# Patient Record
Sex: FEMALE | Race: WHITE
[De-identification: names, ages, dates, MRNs, and addresses within clinical notes are randomized per-mention and may not be internally consistent; named-entity substitution may affect disease eponyms.]

---

## 2021-12-20 ENCOUNTER — HOSPITAL ENCOUNTER (EMERGENCY)
Dept: HOSPITAL 61 - ER | Age: 46
Discharge: HOME | End: 2021-12-20
Payer: COMMERCIAL

## 2021-12-20 VITALS — DIASTOLIC BLOOD PRESSURE: 74 MMHG | SYSTOLIC BLOOD PRESSURE: 116 MMHG

## 2021-12-20 VITALS — WEIGHT: 190.48 LBS | HEIGHT: 62 IN | BODY MASS INDEX: 35.05 KG/M2

## 2021-12-20 DIAGNOSIS — K80.20: Primary | ICD-10-CM

## 2021-12-20 LAB
ALBUMIN SERPL-MCNC: 3.2 G/DL (ref 3.4–5)
ALBUMIN/GLOB SERPL: 0.9 {RATIO} (ref 1–1.7)
ALP SERPL-CCNC: 72 U/L (ref 46–116)
ALT SERPL-CCNC: 43 U/L (ref 14–59)
ANION GAP SERPL CALC-SCNC: 10 MMOL/L (ref 6–14)
APTT PPP: YELLOW S
AST SERPL-CCNC: 20 U/L (ref 15–37)
BACTERIA #/AREA URNS HPF: (no result) /HPF
BASOPHILS # BLD AUTO: 0.2 X10^3/UL (ref 0–0.2)
BASOPHILS NFR BLD: 1 % (ref 0–3)
BILIRUB SERPL-MCNC: 0.2 MG/DL (ref 0.2–1)
BILIRUB UR QL STRIP: NEGATIVE
BUN SERPL-MCNC: 17 MG/DL (ref 7–20)
BUN/CREAT SERPL: 21 (ref 6–20)
CALCIUM SERPL-MCNC: 9 MG/DL (ref 8.5–10.1)
CHLORIDE SERPL-SCNC: 102 MMOL/L (ref 98–107)
CO2 SERPL-SCNC: 26 MMOL/L (ref 21–32)
CREAT SERPL-MCNC: 0.8 MG/DL (ref 0.6–1)
EOSINOPHIL NFR BLD: 0.3 X10^3/UL (ref 0–0.7)
EOSINOPHIL NFR BLD: 2 % (ref 0–3)
ERYTHROCYTE [DISTWIDTH] IN BLOOD BY AUTOMATED COUNT: 13.2 % (ref 11.5–14.5)
FIBRINOGEN PPP-MCNC: CLEAR MG/DL
GFR SERPLBLD BASED ON 1.73 SQ M-ARVRAT: 77.6 ML/MIN
GLUCOSE SERPL-MCNC: 115 MG/DL (ref 70–99)
HCT VFR BLD CALC: 36.5 % (ref 36–47)
HGB BLD-MCNC: 12.4 G/DL (ref 12–15.5)
LIPASE: 91 U/L (ref 73–393)
LYMPHOCYTES # BLD: 2.8 X10^3/UL (ref 1–4.8)
LYMPHOCYTES NFR BLD AUTO: 23 % (ref 24–48)
MAGNESIUM SERPL-MCNC: 1.7 MG/DL (ref 1.8–2.4)
MCH RBC QN AUTO: 31 PG (ref 25–35)
MCHC RBC AUTO-ENTMCNC: 34 G/DL (ref 31–37)
MCV RBC AUTO: 91 FL (ref 79–100)
MONO #: 0.7 X10^3/UL (ref 0–1.1)
MONOCYTES NFR BLD: 6 % (ref 0–9)
NEUT #: 8.1 X10^3/UL (ref 1.8–7.7)
NEUTROPHILS NFR BLD AUTO: 67 % (ref 31–73)
NITRITE UR QL STRIP: NEGATIVE
PH UR STRIP: 7 [PH]
PLATELET # BLD AUTO: 210 X10^3/UL (ref 140–400)
POTASSIUM SERPL-SCNC: 3.5 MMOL/L (ref 3.5–5.1)
PROT SERPL-MCNC: 6.9 G/DL (ref 6.4–8.2)
PROT UR STRIP-MCNC: NEGATIVE MG/DL
RBC # BLD AUTO: 4 X10^6/UL (ref 3.5–5.4)
RBC #/AREA URNS HPF: (no result) /HPF (ref 0–2)
SODIUM SERPL-SCNC: 138 MMOL/L (ref 136–145)
UROBILINOGEN UR-MCNC: 0.2 MG/DL
WBC # BLD AUTO: 12 X10^3/UL (ref 4–11)
WBC #/AREA URNS HPF: (no result) /HPF (ref 0–4)

## 2021-12-20 PROCEDURE — 85025 COMPLETE CBC W/AUTO DIFF WBC: CPT

## 2021-12-20 PROCEDURE — 87086 URINE CULTURE/COLONY COUNT: CPT

## 2021-12-20 PROCEDURE — 80053 COMPREHEN METABOLIC PANEL: CPT

## 2021-12-20 PROCEDURE — 96375 TX/PRO/DX INJ NEW DRUG ADDON: CPT

## 2021-12-20 PROCEDURE — 99285 EMERGENCY DEPT VISIT HI MDM: CPT

## 2021-12-20 PROCEDURE — 96361 HYDRATE IV INFUSION ADD-ON: CPT

## 2021-12-20 PROCEDURE — 81001 URINALYSIS AUTO W/SCOPE: CPT

## 2021-12-20 PROCEDURE — 74176 CT ABD & PELVIS W/O CONTRAST: CPT

## 2021-12-20 PROCEDURE — 36415 COLL VENOUS BLD VENIPUNCTURE: CPT

## 2021-12-20 PROCEDURE — 96374 THER/PROPH/DIAG INJ IV PUSH: CPT

## 2021-12-20 PROCEDURE — 83735 ASSAY OF MAGNESIUM: CPT

## 2021-12-20 PROCEDURE — 83690 ASSAY OF LIPASE: CPT

## 2021-12-20 NOTE — RAD
EXAM: CT ABDOMEN/PELVIS WITHOUT CONTRAST.



HISTORY: Right flank pain.



TECHNIQUE: Computed tomography of the abdomen and pelvis was performed without intravenous contrast. 
One or more of the following individualized dose reduction techniques were utilized for this examinat
ion:  

1. Automated exposure control.  

2. Adjustment of the mA and/or kV according to patient size.  

3. Use of iterative reconstruction technique.



COMPARISON: None.



FINDINGS: Lung windows through the visualized portions of the bases reveal a mild pectus excavatum de
formity. Bilateral mastectomy changes are suspected. Bone windows reveal no suspicious lesions.



Hypoattenuation of the hepatic parenchyma is consistent with diffuse hepatic steatosis. There are roberta
cified granulomas in the spleen. A gallstone is noted. An 18 x 12 mm nodule in the left adrenal gland
 measures 3 Hounsfield units and is consistent with a benign adenoma. The right adrenal gland is unre
markable. There are no pathologically enlarged lymph nodes.



The appendix is not inflamed. There is no small bowel obstruction.



There are no renal or ureteral calculi. There is no hydronephrosis. The kidneys are unremarkable with
out contrast.



IMPRESSION: 

1. No renal or ureteral calculi.

2. Cholelithiasis.

3. 18 mm benign left adrenal adenoma.

4. Suspect diffuse hepatic steatosis.



Electronically signed by: MELONY Gibbs MD (12/20/2021 3:50 AM) [facility]

## 2021-12-20 NOTE — PHYS DOC
Past Medical History


Additional Past Medical Histor:  BREAST CA, THYROID DISEASE, OVARIAN CYSTS


Past Surgical History:  Cancer Surgery, Other


Additional Past Surgical Histo:  DOUBLE MASTECTOMY, OVARIAN CYST REMOVAL, 75% 

THYROIDECTOMY,PORT


Smoking Status:  Never Smoker


Alcohol Use:  Occasionally





General Adult


EDM:


Chief Complaint:  ABDOMINAL PAIN





HPI:


HPI:


Patient is a 46  year old female presenting with abdominal pain for 3 hours. 

Patient's pain starts in the right upper quadrant and radiates to the right 

flank. She has a similar episode last Friday but states today's episode is 

worse. She reports feeling hot and clammy but denies nausea, vomiting and 

diarrhea.





Review of Systems:


Review of Systems:


Constitutional: Denies fever or chills 


Eyes: Denies redness or eye pain 


HENT: Denies nasal congestion or sore throat


Respiratory: Denies cough or shortness of breath 


Cardiovascular: Denies chest pain or palpitations


GI: Reports abdominal pain. Denies nausea, or vomiting


: Denies dysuria or hematuria. Reports right flank pain


Musculoskeletal: Denies back pain or joint pain


Integument: Denies rash or skin lesions 


Neurologic: Denies headache, focal weakness or sensory changes





Complete systems were reviewed and found to be within normal limits, except as 

documented in this note.





Heart Score:


C/O Chest Pain:  N/A





Current Medications:





Current Medications








 Medications


  (Trade)  Dose


 Ordered  Sig/[facility]  Start Time


 Stop Time Status Last Admin


Dose Admin


 


 Ketorolac


 Tromethamine


  (Toradol 30mg


 Vial)  15 mg  1X  ONCE  12/20/21 03:00


 12/20/21 03:01 DC 12/20/21 02:40


15 MG


 


 Metoclopramide HCl


  (Reglan Vial)  10 mg  1X  ONCE  12/20/21 03:00


 12/20/21 03:01 DC 12/20/21 02:41


10 MG


 


 Sodium Chloride  1,000 ml @ 


 1,000 mls/hr  1X  ONCE  12/20/21 03:00


 12/20/21 03:59  12/20/21 02:42


1,000 MLS/HR











Allergies:


Allergies:





Allergies








Coded Allergies Type Severity Reaction Last Updated Verified


 


  No Known Drug Allergies    12/20/21 No











Physical Exam:


PE:


Constitutional: Well developed, well nourished, mild distress, non-toxic 

appearance


HENT: Normocephalic, atraumatic


Eyes: PERRL, EOMI, conjunctiva normal, no discharge


Neck: Normal range of motion, no tenderness, supple


Lungs & Thorax:  No respiratory distress, equal chest rise and fall


Abdomen: Tenderness and guarding on the right upper quadrant. Normal bowel 

sounds in all quadrants. 


Skin: Warm, dry, no erythema, no rash


Back: No tenderness, Tenderness to palpation at the right CVA


Extremities: No tenderness, ROM intact, no edema


Neurologic: Alert and oriented X 3, normal motor function, normal sensory 

function, no focal deficits noted


Psychologic: Affect normal, judgment normal





Current Patient Data:


Labs:





                                Laboratory Tests








Test


 12/20/21


01:42 12/20/21


02:38


 


Urine Collection Type Unknown   


 


Urine Color Yellow   


 


Urine Clarity Clear   


 


Urine pH


 7.0 (<5.0-8.0)


 





 


Urine Specific Gravity


 1.015


(1.000-1.030) 





 


Urine Protein


 Negative mg/dL


(NEG-TRACE) 





 


Urine Glucose (UA)


 Negative mg/dL


(NEG) 





 


Urine Ketones (Stick)


 Negative mg/dL


(NEG) 





 


Urine Blood


 Negative (NEG)


 





 


Urine Nitrite


 Negative (NEG)


 





 


Urine Bilirubin


 Negative (NEG)


 





 


Urine Urobilinogen Dipstick


 0.2 mg/dL (0.2


mg/dL) 





 


Urine Leukocyte Esterase


 Moderate (NEG)


 





 


Urine RBC


 Occ /HPF (0-2)


 





 


Urine WBC


 20-40 /HPF


(0-4) 





 


Urine Squamous Epithelial


Cells Many /LPF  


 





 


Urine Bacteria


 Many /HPF


(0-FEW) 





 


Urine Mucus Mod /LPF   


 


White Blood Count


 


 12.0 x10^3/uL


(4.0-11.0)  H


 


Red Blood Count


 


 4.00 x10^6/uL


(3.50-5.40)


 


Hemoglobin


 


 12.4 g/dL


(12.0-15.5)


 


Hematocrit


 


 36.5 %


(36.0-47.0)


 


Mean Corpuscular Volume


 


 91 fL ()





 


Mean Corpuscular Hemoglobin  31 pg (25-35)  


 


Mean Corpuscular Hemoglobin


Concent 


 34 g/dL


(31-37)


 


Red Cell Distribution Width


 


 13.2 %


(11.5-14.5)


 


Platelet Count


 


 210 x10^3/uL


(140-400)


 


Neutrophils (%) (Auto)  67 % (31-73)  


 


Lymphocytes (%) (Auto)  23 % (24-48)  L


 


Monocytes (%) (Auto)  6 % (0-9)  


 


Eosinophils (%) (Auto)  2 % (0-3)  


 


Basophils (%) (Auto)  1 % (0-3)  


 


Neutrophils # (Auto)


 


 8.1 x10^3/uL


(1.8-7.7)  H


 


Lymphocytes # (Auto)


 


 2.8 x10^3/uL


(1.0-4.8)


 


Monocytes # (Auto)


 


 0.7 x10^3/uL


(0.0-1.1)


 


Eosinophils # (Auto)


 


 0.3 x10^3/uL


(0.0-0.7)


 


Basophils # (Auto)


 


 0.2 x10^3/uL


(0.0-0.2)


 


Sodium Level


 


 138 mmol/L


(136-145)


 


Potassium Level


 


 3.5 mmol/L


(3.5-5.1)


 


Chloride Level


 


 102 mmol/L


()


 


Carbon Dioxide Level


 


 26 mmol/L


(21-32)


 


Anion Gap  10 (6-14)  


 


Blood Urea Nitrogen


 


 17 mg/dL


(7-20)


 


Creatinine


 


 0.8 mg/dL


(0.6-1.0)


 


Estimated GFR


(Cockcroft-Gault) 


 77.6  





 


BUN/Creatinine Ratio  21 (6-20)  H


 


Glucose Level


 


 115 mg/dL


(70-99)  H


 


Calcium Level


 


 9.0 mg/dL


(8.5-10.1)


 


Magnesium Level


 


 1.7 mg/dL


(1.8-2.4)  L


 


Total Bilirubin


 


 0.2 mg/dL


(0.2-1.0)


 


Aspartate Amino Transferase


(AST) 


 20 U/L (15-37)





 


Alanine Aminotransferase (ALT)


 


 43 U/L (14-59)





 


Alkaline Phosphatase


 


 72 U/L


()


 


Total Protein


 


 6.9 g/dL


(6.4-8.2)


 


Albumin


 


 3.2 g/dL


(3.4-5.0)  L


 


Albumin/Globulin Ratio


 


 0.9 (1.0-1.7)


L


 


Lipase


 


 91 U/L


()





                                Laboratory Tests


12/20/21 02:38








                                Laboratory Tests


12/20/21 02:38








Vital Signs:





                                   Vital Signs








  Date Time  Temp Pulse Resp B/P (MAP) Pulse Ox O2 Delivery O2 Flow Rate FiO2


 


12/20/21 02:44  70 17 135/86 (102) 97 Room Air  


 


12/20/21 01:49 98.1       





 98.1       











Course & Med Decision Making:


Course & Med Decision Making


41 year old female presenting with right upper quadrant abdominal pain and right

flank pain for 3 hours. Patient has no nausea, vomiting or diarrhea and feels 

hot and clammy. CT of the abdomen and pelvis showed cholelithiasis and negative 

for kidney stone. Urine showed moderate leukocyte esterase and bacteria, 

possibly suggestive of pyelonephritis. Based on these findings, patient's 

condition may be caused by cholelithiasis and/or pyelonephritis. Patient will be

treated with antibiotics and discharged home. Patient stable for discharge with 

outpatient follow-up with PCP. Discussed findings and plan with patient, who 

acknowledges understanding and agreement.





Dragon Disclaimer:


Dragon Disclaimer:


This electronic medical record was generated, in whole or in part, using a voice

recognition dictation system.





Departure


Departure


Impression:  


   Primary Impression:  


   Cholelithiasis


   Qualified Codes:  K80.20 - Calculus of gallbladder without cholecystitis 

   without obstruction


   Additional Impression:  


   Flank pain


Disposition:  01 HOME / SELF CARE / HOMELESS


Condition:  STABLE


Referrals:  


ALL HOLDEN MD (PCP)








GURDEEP HOUSTON MD


Patient Instructions:  Abdominal Pain, Easy-to-Read, Cholelithiasis, 

Easy-to-Read, Pyelonephritis, Adult, Easy-to-Read


Scripts


Hydrocodone Bit/Acetaminophen (HYDROCODONE-APAP 5-325  **) 1 Tab Tablet


0.5-1 TAB PO PRN Q6HRS PRN for PAIN, #14 TAB 0 Refills


   Prov: LAVELLE BARTLETT DO         12/20/21 


Cephalexin (CEPHALEXIN) 500 Mg Tablet


1 TAB PO TID for 7 Days, #21 TAB


   Prov: LAVELLE BARTLETT DO         12/20/21











LAVELLE BARTLETT DO             Dec 20, 2021 03:51

## 2022-02-07 ENCOUNTER — HOSPITAL ENCOUNTER (OUTPATIENT)
Dept: HOSPITAL 61 - ER | Age: 47
Setting detail: OBSERVATION
LOS: 2 days | Discharge: HOME | End: 2022-02-09
Attending: INTERNAL MEDICINE | Admitting: INTERNAL MEDICINE
Payer: COMMERCIAL

## 2022-02-07 VITALS — DIASTOLIC BLOOD PRESSURE: 59 MMHG | SYSTOLIC BLOOD PRESSURE: 100 MMHG

## 2022-02-07 VITALS — HEIGHT: 62 IN | BODY MASS INDEX: 35.05 KG/M2 | WEIGHT: 190.48 LBS

## 2022-02-07 VITALS — SYSTOLIC BLOOD PRESSURE: 117 MMHG | DIASTOLIC BLOOD PRESSURE: 90 MMHG

## 2022-02-07 VITALS — DIASTOLIC BLOOD PRESSURE: 76 MMHG | SYSTOLIC BLOOD PRESSURE: 110 MMHG

## 2022-02-07 VITALS — SYSTOLIC BLOOD PRESSURE: 90 MMHG | DIASTOLIC BLOOD PRESSURE: 49 MMHG

## 2022-02-07 DIAGNOSIS — Z79.899: ICD-10-CM

## 2022-02-07 DIAGNOSIS — Z92.21: ICD-10-CM

## 2022-02-07 DIAGNOSIS — C50.919: ICD-10-CM

## 2022-02-07 DIAGNOSIS — Z98.890: ICD-10-CM

## 2022-02-07 DIAGNOSIS — E89.0: ICD-10-CM

## 2022-02-07 DIAGNOSIS — K80.00: ICD-10-CM

## 2022-02-07 DIAGNOSIS — K80.10: ICD-10-CM

## 2022-02-07 DIAGNOSIS — Z87.891: ICD-10-CM

## 2022-02-07 DIAGNOSIS — Z79.810: ICD-10-CM

## 2022-02-07 DIAGNOSIS — I10: ICD-10-CM

## 2022-02-07 DIAGNOSIS — R10.11: ICD-10-CM

## 2022-02-07 DIAGNOSIS — D35.00: ICD-10-CM

## 2022-02-07 DIAGNOSIS — K21.9: ICD-10-CM

## 2022-02-07 DIAGNOSIS — K80.40: ICD-10-CM

## 2022-02-07 DIAGNOSIS — Z90.13: ICD-10-CM

## 2022-02-07 DIAGNOSIS — K76.0: ICD-10-CM

## 2022-02-07 DIAGNOSIS — K82.8: ICD-10-CM

## 2022-02-07 DIAGNOSIS — N39.0: ICD-10-CM

## 2022-02-07 DIAGNOSIS — K80.01: Primary | ICD-10-CM

## 2022-02-07 DIAGNOSIS — K83.9: ICD-10-CM

## 2022-02-07 DIAGNOSIS — N83.209: ICD-10-CM

## 2022-02-07 DIAGNOSIS — E04.9: ICD-10-CM

## 2022-02-07 DIAGNOSIS — Z20.822: ICD-10-CM

## 2022-02-07 DIAGNOSIS — Z85.3: ICD-10-CM

## 2022-02-07 DIAGNOSIS — R13.10: ICD-10-CM

## 2022-02-07 LAB
ALBUMIN SERPL-MCNC: 3.7 G/DL (ref 3.4–5)
ALBUMIN/GLOB SERPL: 0.9 {RATIO} (ref 1–1.7)
ALP SERPL-CCNC: 68 U/L (ref 46–116)
ALT SERPL-CCNC: 40 U/L (ref 14–59)
ANION GAP SERPL CALC-SCNC: 13 MMOL/L (ref 6–14)
APTT PPP: YELLOW S
AST SERPL-CCNC: 18 U/L (ref 15–37)
BACTERIA #/AREA URNS HPF: (no result) /HPF
BASOPHILS # BLD AUTO: 0.1 X10^3/UL (ref 0–0.2)
BASOPHILS NFR BLD: 1 % (ref 0–3)
BILIRUB SERPL-MCNC: 0.2 MG/DL (ref 0.2–1)
BILIRUB UR QL STRIP: NEGATIVE
BUN SERPL-MCNC: 14 MG/DL (ref 7–20)
BUN/CREAT SERPL: 18 (ref 6–20)
CALCIUM SERPL-MCNC: 9.4 MG/DL (ref 8.5–10.1)
CHLORIDE SERPL-SCNC: 100 MMOL/L (ref 98–107)
CO2 SERPL-SCNC: 26 MMOL/L (ref 21–32)
CREAT SERPL-MCNC: 0.8 MG/DL (ref 0.6–1)
EOSINOPHIL NFR BLD: 0 % (ref 0–3)
EOSINOPHIL NFR BLD: 0 X10^3/UL (ref 0–0.7)
ERYTHROCYTE [DISTWIDTH] IN BLOOD BY AUTOMATED COUNT: 13.2 % (ref 11.5–14.5)
FIBRINOGEN PPP-MCNC: CLEAR MG/DL
GFR SERPLBLD BASED ON 1.73 SQ M-ARVRAT: 77.6 ML/MIN
GLUCOSE SERPL-MCNC: 123 MG/DL (ref 70–99)
HCT VFR BLD CALC: 39.4 % (ref 36–47)
HGB BLD-MCNC: 13.4 G/DL (ref 12–15.5)
LIPASE: 68 U/L (ref 73–393)
LYMPHOCYTES # BLD: 1.7 X10^3/UL (ref 1–4.8)
LYMPHOCYTES NFR BLD AUTO: 14 % (ref 24–48)
MCH RBC QN AUTO: 31 PG (ref 25–35)
MCHC RBC AUTO-ENTMCNC: 34 G/DL (ref 31–37)
MCV RBC AUTO: 91 FL (ref 79–100)
MONO #: 0.3 X10^3/UL (ref 0–1.1)
MONOCYTES NFR BLD: 3 % (ref 0–9)
NEUT #: 10.3 X10^3/UL (ref 1.8–7.7)
NEUTROPHILS NFR BLD AUTO: 83 % (ref 31–73)
NITRITE UR QL STRIP: NEGATIVE
PH UR STRIP: 6.5 [PH]
PLATELET # BLD AUTO: 249 X10^3/UL (ref 140–400)
POTASSIUM SERPL-SCNC: 3.6 MMOL/L (ref 3.5–5.1)
PROT SERPL-MCNC: 7.8 G/DL (ref 6.4–8.2)
PROT UR STRIP-MCNC: NEGATIVE MG/DL
RBC # BLD AUTO: 4.34 X10^6/UL (ref 3.5–5.4)
RBC #/AREA URNS HPF: (no result) /HPF (ref 0–2)
SODIUM SERPL-SCNC: 139 MMOL/L (ref 136–145)
UROBILINOGEN UR-MCNC: 0.2 MG/DL
WBC # BLD AUTO: 12.4 X10^3/UL (ref 4–11)
WBC #/AREA URNS HPF: (no result) /HPF (ref 0–4)
YEAST #/AREA URNS HPF: PRESENT /HPF

## 2022-02-07 PROCEDURE — 87106 FUNGI IDENTIFICATION YEAST: CPT

## 2022-02-07 PROCEDURE — 96366 THER/PROPH/DIAG IV INF ADDON: CPT

## 2022-02-07 PROCEDURE — A4314 CATH W/DRAINAGE 2-WAY LATEX: HCPCS

## 2022-02-07 PROCEDURE — 83690 ASSAY OF LIPASE: CPT

## 2022-02-07 PROCEDURE — 87086 URINE CULTURE/COLONY COUNT: CPT

## 2022-02-07 PROCEDURE — 76705 ECHO EXAM OF ABDOMEN: CPT

## 2022-02-07 PROCEDURE — 87040 BLOOD CULTURE FOR BACTERIA: CPT

## 2022-02-07 PROCEDURE — 87426 SARSCOV CORONAVIRUS AG IA: CPT

## 2022-02-07 PROCEDURE — 96361 HYDRATE IV INFUSION ADD-ON: CPT

## 2022-02-07 PROCEDURE — U0003 INFECTIOUS AGENT DETECTION BY NUCLEIC ACID (DNA OR RNA); SEVERE ACUTE RESPIRATORY SYNDROME CORONAVIRUS 2 (SARS-COV-2) (CORONAVIRUS DISEASE [COVID-19]), AMPLIFIED PROBE TECHNIQUE, MAKING USE OF HIGH THROUGHPUT TECHNOLOGIES AS DESCRIBED BY CMS-2020-01-R: HCPCS

## 2022-02-07 PROCEDURE — 96365 THER/PROPH/DIAG IV INF INIT: CPT

## 2022-02-07 PROCEDURE — 99285 EMERGENCY DEPT VISIT HI MDM: CPT

## 2022-02-07 PROCEDURE — 74300 X-RAY BILE DUCTS/PANCREAS: CPT

## 2022-02-07 PROCEDURE — 80076 HEPATIC FUNCTION PANEL: CPT

## 2022-02-07 PROCEDURE — G0378 HOSPITAL OBSERVATION PER HR: HCPCS

## 2022-02-07 PROCEDURE — 80048 BASIC METABOLIC PNL TOTAL CA: CPT

## 2022-02-07 PROCEDURE — G0379 DIRECT REFER HOSPITAL OBSERV: HCPCS

## 2022-02-07 PROCEDURE — 47563 LAPARO CHOLECYSTECTOMY/GRAPH: CPT

## 2022-02-07 PROCEDURE — 88304 TISSUE EXAM BY PATHOLOGIST: CPT

## 2022-02-07 PROCEDURE — 96375 TX/PRO/DX INJ NEW DRUG ADDON: CPT

## 2022-02-07 PROCEDURE — A4213 20+ CC SYRINGE ONLY: HCPCS

## 2022-02-07 PROCEDURE — 81025 URINE PREGNANCY TEST: CPT

## 2022-02-07 PROCEDURE — 85025 COMPLETE CBC W/AUTO DIFF WBC: CPT

## 2022-02-07 PROCEDURE — 96376 TX/PRO/DX INJ SAME DRUG ADON: CPT

## 2022-02-07 PROCEDURE — 80053 COMPREHEN METABOLIC PANEL: CPT

## 2022-02-07 PROCEDURE — A6219 GAUZE <= 16 SQ IN W/BORDER: HCPCS

## 2022-02-07 PROCEDURE — 36415 COLL VENOUS BLD VENIPUNCTURE: CPT

## 2022-02-07 PROCEDURE — C1887 CATHETER, GUIDING: HCPCS

## 2022-02-07 PROCEDURE — A4930 STERILE, GLOVES PER PAIR: HCPCS

## 2022-02-07 PROCEDURE — C9113 INJ PANTOPRAZOLE SODIUM, VIA: HCPCS

## 2022-02-07 PROCEDURE — 81001 URINALYSIS AUTO W/SCOPE: CPT

## 2022-02-07 RX ADMIN — PIPERACILLIN SODIUM AND TAZOBACTAM SODIUM SCH MLS/HR: 3; .375 INJECTION, POWDER, LYOPHILIZED, FOR SOLUTION INTRAVENOUS at 23:03

## 2022-02-07 RX ADMIN — PIPERACILLIN SODIUM AND TAZOBACTAM SODIUM SCH MLS/HR: 3; .375 INJECTION, POWDER, LYOPHILIZED, FOR SOLUTION INTRAVENOUS at 13:41

## 2022-02-07 RX ADMIN — PANTOPRAZOLE SODIUM SCH MG: 40 INJECTION, POWDER, FOR SOLUTION INTRAVENOUS at 11:43

## 2022-02-07 RX ADMIN — PIPERACILLIN SODIUM AND TAZOBACTAM SODIUM SCH MLS/HR: 3; .375 INJECTION, POWDER, LYOPHILIZED, FOR SOLUTION INTRAVENOUS at 17:57

## 2022-02-07 NOTE — PDOC2
CLAUDIA BERG APRN 2/7/22 1315:


CONSULT


Date of Consult


Date of Consult


DATE: 2/7/22 


TIME: 13:03





Reason for Consult


Reason for Consult:


cholecystitis





Referring Physician


Referring Physician:


ER





Identification/Chief Complaint


Chief Complaint


abdominal pain





Source


Source:  Chart review, Patient





History of Present Illness


Reason for Visit:


Acute onset of upper abdominal pain, radiates to back, associated nausea, em

esis.  Similar symptoms in Dec, also treated for UTI, resolved, has not 

reoccurred until now





Past Medical History


GI:  GERD


Heme/Onc:  Cancer (breast )


Psych:  Anxiety





Past Surgical History


Past Surgical History:  No pertinent history





Family History


Family History:  Other (non contributory to current illness )





Social History


Quit


ALCOHOL:  none


Drugs:  None





Current Problem List


Problem List


Problems


Medical Problems:


(1) Acute cholecystitis


Status: Acute  





(2) Right upper quadrant abdominal pain


Status: Acute  











Current Medications


Current Medications





Current Medications


Sodium Chloride 1,000 ml @  1,000 mls/hr 1X  ONCE IV  Last administered on 

2/7/22at 06:29;  Start 2/7/22 at 06:15;  Stop 2/7/22 at 07:14;  Status DC


Ondansetron HCl (Zofran) 4 mg 1X  ONCE IVP  Last administered on 2/7/22at 06:29;

 Start 2/7/22 at 06:15;  Stop 2/7/22 at 06:17;  Status DC


Morphine Sulfate (Morphine Sulfate) 4 mg 1X  ONCE IVP  Last administered on 

2/7/22at 06:28;  Start 2/7/22 at 06:30;  Stop 2/7/22 at 06:31;  Status DC


Ketorolac Tromethamine (Toradol 15mg Vial) 15 mg 1X  ONCE IVP  Last administered

on 2/7/22at 06:58;  Start 2/7/22 at 06:30;  Stop 2/7/22 at 06:31;  Status DC


Piperacillin Sod/ Tazobactam Sod 4.5 gm/Sodium Chloride 100 ml @  200 mls/hr 1X 

ONCE IV ;  Start 2/7/22 at 07:30;  Stop 2/7/22 at 07:32;  Status DC


Piperacillin Sod/ Tazobactam Sod 4.5 gm/Dextrose 100 ml @  200 mls/hr 1X  ONCE 

IV  Last administered on 2/7/22at 08:25;  Start 2/7/22 at 07:32;  Stop 2/7/22 at

07:59;  Status DC


Ondansetron HCl (Zofran) 4 mg PRN Q8HRS  PRN IVP NAUSEA/VOMITING;  Start 2/7/22 

at 07:45;  Stop 2/8/22 at 07:44


Morphine Sulfate (Morphine Sulfate) 2 mg PRN Q2HR  PRN IVP PAIN;  Start 2/7/22 

at 07:45;  Stop 2/8/22 at 07:44


Dextrose/Lactated Ringer's 1,000 ml @  125 mls/hr 1X  ONCE IV  Last administered

on 2/7/22at 11:41;  Start 2/7/22 at 07:45;  Stop 2/7/22 at 15:44


Pantoprazole Sodium (PROTONIX VIAL for IV PUSH) 40 mg DAILY IVP  Last 

administered on 2/7/22at 11:43;  Start 2/7/22 at 09:15


Piperacillin Sod/ Tazobactam Sod 3.375 gm/Sodium Chloride 50 ml @  100 mls/hr 

Q6HRS IV ;  Start 2/7/22 at 13:30





Active Scripts


Active


Hydrocodone-Apap 5-325  ** (Hydrocodone Bit/Acetaminophen) 1 Tab Tablet 0.5-1 

Tab PO PRN Q6HRS PRN


Cephalexin 500 Mg Tablet 1 Tab PO TID 7 Days





Allergies


Allergies:  


Coded Allergies:  


     No Known Drug Allergies (Unverified , 12/20/21)





ROS


General:  No: Chills, Malaise


PSYCHOLOGICAL ROS:  No: Anxiety, Depression


Eyes:  No Blurry vision


HEENT:  No: Heacaches, Sore Throat


Hematological and Lymphatic:  No: Bleeding Problems, Blood Clots


Respiratory:  No: Cough, Shortness of breath


Cardiovascular:  No Chest Pain, No Palpitations


Gastrointestinal:  Yes Other (see hpi)


Genitourinary:  No Dysuria, No Hematuria


Musculoskeletal:  No Joint Pain, No Muscle Pain


Neurological:  No Impaired Coord/balance, No Numbness/Tingling


Skin:  No Pruritus, No Rash





Physical Exam


General:  Alert, Oriented X3, Cooperative


HEENT:  Atraumatic, PERRLA


Lungs:  Clear to auscultation, Normal air movement


Heart:  Regular rate, Normal S1, Normal S2


Abdomen:  Soft, Other (mild epigastric ttp )


Extremities:  No clubbing, No cyanosis


Skin:  No rashes, No breakdown


Neuro:  Normal gait, Normal speech


Psych/Mental Status:  Mental status NL, Mood NL


MUSCULOSKELETAL:  No deformity, No swelling





Vitals


VITALS





Vital Signs








  Date Time  Temp Pulse Resp B/P (MAP) Pulse Ox O2 Delivery O2 Flow Rate FiO2


 


2/7/22 11:00 98.0 73 19 117/90 (99) 96 Room Air  





 98.0       











Labs


Labs





Laboratory Tests








Test


 2/7/22


05:15 2/7/22


05:18 2/7/22


06:30 2/7/22


07:26


 


Urine Collection Type Unknown    


 


Urine Color Yellow    


 


Urine Clarity Clear    


 


Urine pH 6.5 (<5.0-8.0)    


 


Urine Specific Gravity


 1.020


(1.000-1.030) 


 


 





 


Urine Protein


 Negative mg/dL


(NEG-TRACE) 


 


 





 


Urine Glucose (UA)


 Negative mg/dL


(NEG) 


 


 





 


Urine Ketones (Stick)


 Trace mg/dL


(NEG) 


 


 





 


Urine Blood Negative (NEG)    


 


Urine Nitrite Negative (NEG)    


 


Urine Bilirubin Negative (NEG)    


 


Urine Urobilinogen Dipstick


 0.2 mg/dL (0.2


mg/dL) 


 


 





 


Urine Leukocyte Esterase Small (NEG)    


 


Urine RBC Occ /HPF (0-2)    


 


Urine WBC


 6-10 /HPF


(0-4) 


 


 





 


Urine Squamous Epithelial


Cells Many /LPF 


 


 


 





 


Urine Bacteria


 Many /HPF


(0-FEW) 


 


 





 


Urine Mucus Marked /LPF    


 


Urine Yeast Present /HPF    


 


Bedside Urine HCG, Qualitative


 


 Hcg negative


(Negative) 


 





 


White Blood Count


 


 


 12.4 x10^3/uL


(4.0-11.0) 





 


Red Blood Count


 


 


 4.34 x10^6/uL


(3.50-5.40) 





 


Hemoglobin


 


 


 13.4 g/dL


(12.0-15.5) 





 


Hematocrit


 


 


 39.4 %


(36.0-47.0) 





 


Mean Corpuscular Volume   91 fL ()  


 


Mean Corpuscular Hemoglobin   31 pg (25-35)  


 


Mean Corpuscular Hemoglobin


Concent 


 


 34 g/dL


(31-37) 





 


Red Cell Distribution Width


 


 


 13.2 %


(11.5-14.5) 





 


Platelet Count


 


 


 249 x10^3/uL


(140-400) 





 


Neutrophils (%) (Auto)   83 % (31-73)  


 


Lymphocytes (%) (Auto)   14 % (24-48)  


 


Monocytes (%) (Auto)   3 % (0-9)  


 


Eosinophils (%) (Auto)   0 % (0-3)  


 


Basophils (%) (Auto)   1 % (0-3)  


 


Neutrophils # (Auto)


 


 


 10.3 x10^3/uL


(1.8-7.7) 





 


Lymphocytes # (Auto)


 


 


 1.7 x10^3/uL


(1.0-4.8) 





 


Monocytes # (Auto)


 


 


 0.3 x10^3/uL


(0.0-1.1) 





 


Eosinophils # (Auto)


 


 


 0.0 x10^3/uL


(0.0-0.7) 





 


Basophils # (Auto)


 


 


 0.1 x10^3/uL


(0.0-0.2) 





 


SARS-CoV-2 Antigen (Rapid)


 


 


 


 Negative


(NEGATIVE)


 


Test


 2/7/22


08:20 


 


 





 


Sodium Level


 139 mmol/L


(136-145) 


 


 





 


Potassium Level


 3.6 mmol/L


(3.5-5.1) 


 


 





 


Chloride Level


 100 mmol/L


() 


 


 





 


Carbon Dioxide Level


 26 mmol/L


(21-32) 


 


 





 


Anion Gap 13 (6-14)    


 


Blood Urea Nitrogen


 14 mg/dL


(7-20) 


 


 





 


Creatinine


 0.8 mg/dL


(0.6-1.0) 


 


 





 


Estimated GFR


(Cockcroft-Gault) 77.6 


 


 


 





 


BUN/Creatinine Ratio 18 (6-20)    


 


Glucose Level


 123 mg/dL


(70-99) 


 


 





 


Calcium Level


 9.4 mg/dL


(8.5-10.1) 


 


 





 


Total Bilirubin


 0.2 mg/dL


(0.2-1.0) 


 


 





 


Aspartate Amino Transf


(AST/SGOT) 18 U/L (15-37) 


 


 


 





 


Alanine Aminotransferase


(ALT/SGPT) 40 U/L (14-59) 


 


 


 





 


Alkaline Phosphatase


 68 U/L


() 


 


 





 


Total Protein


 7.8 g/dL


(6.4-8.2) 


 


 





 


Albumin


 3.7 g/dL


(3.4-5.0) 


 


 





 


Albumin/Globulin Ratio 0.9 (1.0-1.7)    


 


Lipase


 68 U/L


() 


 


 











Laboratory Tests








Test


 2/7/22


05:15 2/7/22


05:18 2/7/22


06:30 2/7/22


07:26


 


Urine Collection Type Unknown    


 


Urine Color Yellow    


 


Urine Clarity Clear    


 


Urine pH 6.5 (<5.0-8.0)    


 


Urine Specific Gravity


 1.020


(1.000-1.030) 


 


 





 


Urine Protein


 Negative mg/dL


(NEG-TRACE) 


 


 





 


Urine Glucose (UA)


 Negative mg/dL


(NEG) 


 


 





 


Urine Ketones (Stick)


 Trace mg/dL


(NEG) 


 


 





 


Urine Blood Negative (NEG)    


 


Urine Nitrite Negative (NEG)    


 


Urine Bilirubin Negative (NEG)    


 


Urine Urobilinogen Dipstick


 0.2 mg/dL (0.2


mg/dL) 


 


 





 


Urine Leukocyte Esterase Small (NEG)    


 


Urine RBC Occ /HPF (0-2)    


 


Urine WBC


 6-10 /HPF


(0-4) 


 


 





 


Urine Squamous Epithelial


Cells Many /LPF 


 


 


 





 


Urine Bacteria


 Many /HPF


(0-FEW) 


 


 





 


Urine Mucus Marked /LPF    


 


Urine Yeast Present /HPF    


 


Bedside Urine HCG, Qualitative


 


 Hcg negative


(Negative) 


 





 


White Blood Count


 


 


 12.4 x10^3/uL


(4.0-11.0) 





 


Red Blood Count


 


 


 4.34 x10^6/uL


(3.50-5.40) 





 


Hemoglobin


 


 


 13.4 g/dL


(12.0-15.5) 





 


Hematocrit


 


 


 39.4 %


(36.0-47.0) 





 


Mean Corpuscular Volume   91 fL ()  


 


Mean Corpuscular Hemoglobin   31 pg (25-35)  


 


Mean Corpuscular Hemoglobin


Concent 


 


 34 g/dL


(31-37) 





 


Red Cell Distribution Width


 


 


 13.2 %


(11.5-14.5) 





 


Platelet Count


 


 


 249 x10^3/uL


(140-400) 





 


Neutrophils (%) (Auto)   83 % (31-73)  


 


Lymphocytes (%) (Auto)   14 % (24-48)  


 


Monocytes (%) (Auto)   3 % (0-9)  


 


Eosinophils (%) (Auto)   0 % (0-3)  


 


Basophils (%) (Auto)   1 % (0-3)  


 


Neutrophils # (Auto)


 


 


 10.3 x10^3/uL


(1.8-7.7) 





 


Lymphocytes # (Auto)


 


 


 1.7 x10^3/uL


(1.0-4.8) 





 


Monocytes # (Auto)


 


 


 0.3 x10^3/uL


(0.0-1.1) 





 


Eosinophils # (Auto)


 


 


 0.0 x10^3/uL


(0.0-0.7) 





 


Basophils # (Auto)


 


 


 0.1 x10^3/uL


(0.0-0.2) 





 


SARS-CoV-2 Antigen (Rapid)


 


 


 


 Negative


(NEGATIVE)


 


Test


 2/7/22


08:20 


 


 





 


Sodium Level


 139 mmol/L


(136-145) 


 


 





 


Potassium Level


 3.6 mmol/L


(3.5-5.1) 


 


 





 


Chloride Level


 100 mmol/L


() 


 


 





 


Carbon Dioxide Level


 26 mmol/L


(21-32) 


 


 





 


Anion Gap 13 (6-14)    


 


Blood Urea Nitrogen


 14 mg/dL


(7-20) 


 


 





 


Creatinine


 0.8 mg/dL


(0.6-1.0) 


 


 





 


Estimated GFR


(Cockcroft-Gault) 77.6 


 


 


 





 


BUN/Creatinine Ratio 18 (6-20)    


 


Glucose Level


 123 mg/dL


(70-99) 


 


 





 


Calcium Level


 9.4 mg/dL


(8.5-10.1) 


 


 





 


Total Bilirubin


 0.2 mg/dL


(0.2-1.0) 


 


 





 


Aspartate Amino Transf


(AST/SGOT) 18 U/L (15-37) 


 


 


 





 


Alanine Aminotransferase


(ALT/SGPT) 40 U/L (14-59) 


 


 


 





 


Alkaline Phosphatase


 68 U/L


() 


 


 





 


Total Protein


 7.8 g/dL


(6.4-8.2) 


 


 





 


Albumin


 3.7 g/dL


(3.4-5.0) 


 


 





 


Albumin/Globulin Ratio 0.9 (1.0-1.7)    


 


Lipase


 68 U/L


() 


 


 














Assessment/Plan


Assessment/Plan


acute cholecystitis


will work on scheduling lap delilah


will review with Dr Downs


ok for clears today


consult, chart 20 mins





ROWENA DOWNS MD 2/7/22 2101:


CONSULT


Assessment/Plan


Assessment/Plan


Pt seen and examined.


Agree with Ms. Berg's note


Pt feels better


abd soft, mild TTP RUQ


OR not available today


will plan cholecystectomy with cholangiogram 2/8 at 1300.


R/R/B/A d/w pt.  Risks, including, but not limited to:  bleeding, infection, 

damage to surrounding structures, risk of anesthesia, risk of open.


She appears to understand, her questions are answered and she elects to proceed.





Thanks for consult!











MARGECLAUDIAGABINO LUNDY             Feb 7, 2022 13:15


ROWENA DOWNS MD              Feb 7, 2022 21:01

## 2022-02-07 NOTE — PHYS DOC
Past Medical History


Additional Past Medical Histor:  BREAST CA, THYROID DISEASE, OVARIAN CYSTS


Past Surgical History:  Cancer Surgery, Other


Additional Past Surgical Histo:  DOUBLE MASTECTOMY, OVARIAN CYST REMOVAL, 75% 

THYROIDECTOMY,PORT


Smoking Status:  Never Smoker


Alcohol Use:  None





Adult General


Chief Complaint


Chief Complaint:  ABDOMINAL PAIN





HPI


HPI


The patient is a 46-year-old female with a history of hypertension and breast 

cancer treated to remission, on tamoxifen.  In December she was found to have 

cholelithiasis on CT scan after an episode of right upper quadrant pain.  She 

presents for evaluation of epigastric and right upper quadrant pain with 

radiation to the back, all with onset about 8 to 10 hours prior to arrival.  

Patient states the discomfort today is identical to the discomfort that she had 

back in December when her cholelithiasis was diagnosed.  Discomfort is constant 

and a 9 out of 10 in severity at present.  Associated nausea with one episode of

nonbloody vomiting.  No associated fevers, hematemesis, hematochezia or melena, 

upper respiratory congestion/rhinorrhea, cough, sore throat, shortness of breath

or chest pain of any kind, lower abdominal pain of any kind, flank pain, midline

back pain, dysuria, hematuria, polyuria or oliguria, changes in bowel habits.





Patient is alert and pleasantly and appropriately interactive and in no acute 

distress with appropriate vital signs upon initial evaluation here in the 

emergency department.





Review of Systems


Review of Systems


A 12 point review of systems was completed and was negative except where noted 

in HPI above.





Current Medications


Current Medications





Current Medications








 Medications


  (Trade)  Dose


 Ordered  Sig/Dandre  Start Time


 Stop Time Status Last Admin


Dose Admin


 


 Dextrose/Lactated


 Ringer's  1,000 ml @ 


 125 mls/hr  1X  ONCE  2/7/22 07:45


 2/7/22 15:44   





 


 Ketorolac


 Tromethamine


  (Toradol 15mg


 Vial)  15 mg  1X  ONCE  2/7/22 06:30


 2/7/22 06:31 DC 2/7/22 06:58


15 MG


 


 Morphine Sulfate


  (Morphine


 Sulfate)  2 mg  PRN Q2HR  PRN  2/7/22 07:45


 2/8/22 07:44   





 


 Ondansetron HCl


  (Zofran)  4 mg  PRN Q8HRS  PRN  2/7/22 07:45


 2/8/22 07:44   





 


 Piperacillin Sod/


 Tazobactam Sod


 4.5 gm/Dextrose  100 ml @ 


 200 mls/hr  1X  ONCE  2/7/22 07:32


 2/7/22 07:59   





 


 Piperacillin Sod/


 Tazobactam Sod


 4.5 gm/Sodium


 Chloride  100 ml @ 


 200 mls/hr  1X  ONCE  2/7/22 07:30


 2/7/22 07:32 DC  





 


 Sodium Chloride  1,000 ml @ 


 1,000 mls/hr  1X  ONCE  2/7/22 06:15


 2/7/22 07:14 DC 2/7/22 06:29


1,000 MLS/HR











Allergies


Allergies





Allergies








Coded Allergies Type Severity Reaction Last Updated Verified


 


  No Known Drug Allergies    12/20/21 No











Physical Exam


Physical Exam


Middle-aged  female appearing nontoxic and in no acute distress.  Head 

is normocephalic and atraumatic.  Neck is supple and nontender.  Oropharynx is 

moist.  Lungs are clear to auscultation at all stations.  There is a normal S1 

and S2 without rubs or gallops and capillary refill is appropriate, less than 2 

seconds globally.  Abdomen is soft and nondistended with mild focal epigastric 

and right upper quadrant tenderness to palpation without rebound or guarding.  

Skin is warm and dry without cyanosis, clubbing or edema.  Psychiatrically, the 

patient demonstrates appropriate mood and affect and is alert.





Current Patient Data


Vital Signs





                                   Vital Signs








  Date Time  Temp Pulse Resp B/P (MAP) Pulse Ox O2 Delivery O2 Flow Rate FiO2


 


2/7/22 07:14  89 16 135/88 (104) 98 Room Air  


 


2/7/22 05:24 98.0       





 98.0       








Lab Values





                                Laboratory Tests








Test


 2/7/22


05:15 2/7/22


05:18 2/7/22


06:30


 


Urine Collection Type Unknown    


 


Urine Color Yellow    


 


Urine Clarity Clear    


 


Urine pH


 6.5 (<5.0-8.0)


 


 





 


Urine Specific Gravity


 1.020


(1.000-1.030) 


 





 


Urine Protein


 Negative mg/dL


(NEG-TRACE) 


 





 


Urine Glucose (UA)


 Negative mg/dL


(NEG) 


 





 


Urine Ketones (Stick)


 Trace mg/dL


(NEG) 


 





 


Urine Blood


 Negative (NEG)


 


 





 


Urine Nitrite


 Negative (NEG)


 


 





 


Urine Bilirubin


 Negative (NEG)


 


 





 


Urine Urobilinogen Dipstick


 0.2 mg/dL (0.2


mg/dL) 


 





 


Urine Leukocyte Esterase Small (NEG)    


 


Urine RBC


 Occ /HPF (0-2)


 


 





 


Urine WBC


 6-10 /HPF


(0-4) 


 





 


Urine Squamous Epithelial


Cells Many /LPF  


 


 





 


Urine Bacteria


 Many /HPF


(0-FEW) 


 





 


Urine Mucus Marked /LPF    


 


Urine Yeast Present /HPF    


 


POC Urine HCG, Qualitative


 


 Hcg negative


(Negative) 





 


White Blood Count


 


 


 12.4 x10^3/uL


(4.0-11.0)  H


 


Red Blood Count


 


 


 4.34 x10^6/uL


(3.50-5.40)


 


Hemoglobin


 


 


 13.4 g/dL


(12.0-15.5)


 


Hematocrit


 


 


 39.4 %


(36.0-47.0)


 


Mean Corpuscular Volume


 


 


 91 fL ()





 


Mean Corpuscular Hemoglobin   31 pg (25-35)  


 


Mean Corpuscular Hemoglobin


Concent 


 


 34 g/dL


(31-37)


 


Red Cell Distribution Width


 


 


 13.2 %


(11.5-14.5)


 


Platelet Count


 


 


 249 x10^3/uL


(140-400)


 


Neutrophils (%) (Auto)   83 % (31-73)  H


 


Lymphocytes (%) (Auto)   14 % (24-48)  L


 


Monocytes (%) (Auto)   3 % (0-9)  


 


Eosinophils (%) (Auto)   0 % (0-3)  


 


Basophils (%) (Auto)   1 % (0-3)  


 


Neutrophils # (Auto)


 


 


 10.3 x10^3/uL


(1.8-7.7)  H


 


Lymphocytes # (Auto)


 


 


 1.7 x10^3/uL


(1.0-4.8)


 


Monocytes # (Auto)


 


 


 0.3 x10^3/uL


(0.0-1.1)


 


Eosinophils # (Auto)


 


 


 0.0 x10^3/uL


(0.0-0.7)


 


Basophils # (Auto)


 


 


 0.1 x10^3/uL


(0.0-0.2)





                                Laboratory Tests


2/7/22 06:30














EKG


EKG


[]





Radiology/Procedures


Radiology/Procedures





Clinical History: Right upper quadrant pain and epigastric pain





Technique:  Sonographic examination of the right upper quadrant of the abdomen 

was performed and multiple static images were obtained.





Comparison:  none





Findings:





Liver:  The majority of the liver is visualized and there is increased echogen

icity and attenuation of sound which further limits the sensitivity for possible

 subtle liver lesion. 


Common bile duct: Dilated to 9 mm in diameter. 


Gallbladder:  Distended with mild wall thickening up to 5 mm there is some small

 stones or gravel.. 


Pancreas:  is not well visualized due to overlying bowel gas.


Right kidney: appears normal and measures 10 cm in length.





Aorta:  normal


IVC: normal





Impression: 





1. Cholelithiasis and acute cholecystitis.


2. Fatty infiltration of liver.


3. Dilated common bile duct.





Electronically signed by: Ander Mota III, MD (2/7/2022 7:00 AM) [facility]














DICTATED and SIGNED BY:     ANDER MOTA III, MD


DATE:     02/07/22 [id number]





Course & Med Decision Making


Course & Med Decision Making


Will place IV and give IV fluids and medication for nausea and discomfort as 

noted and will check labs, urine and a right upper quadrant ultrasound and will 

then reevaluate.





0720: Work-up as above, remarkable for sonographic evidence of acute 

cholecystitis with possible superimposed acute choledocholithiasis.  Initial 

chemistry blood draw hemolyzed so lab was redrawn; still waiting on LFTs and l

ipase at this writing.  Dose Zosyn given.  Pain is very well controlled on 

serial reassessments.  Will bring in for further care under hospitalist Dr. Medrano, who graciously accepts.  Dr. Downs and Dr. Tello have been consulted 

for inpatient management.





Dragon Disclaimer


Dragon Disclaimer


This electronic medical record was generated, in whole or in part, using a voice

 recognition dictation system.





Departure


Departure


Impression:  


   Primary Impression:  


   Acute cholecystitis


Disposition:  09 ADMITTED AS INPATIENT


Condition:  STABLE


Referrals:  


ALL HOLDEN MD (PCP)











ANTHONY WAGNER MD                 Feb 7, 2022 06:24

## 2022-02-07 NOTE — NUR
patient arrived to room 530 via wheelchair transport from ED.  Pt NPO at this time and on 
room air. pt states she is not in pain at this time.  will continue to monitor.

## 2022-02-07 NOTE — PDOC2
GI CONSULT


Date of Service:


DATE: 2/7/22 


TIME: 08:52





Reason For Consult:


choledocholithiasis, cholecystitis





HPI:


HPI:


Pleasant 47 y/o female w/ worsening upper abdominal discomfort since last night 

at 9:30 p.m. - awoke from sleep, occurred several hours after eating.  

Associated w/ n/v and radiation of pain around/through to upper back.  


Similar symptoms in 12/2021 (without vomiting) - at that time was noted to have 

cholelithiasis on CT but also question of kidney infection - was treated w/ 

antibiotics, pain resolved, and no issues til last night.





H/o heartburn improved w/ daily Rx medication - initially bothersome during 

chemo 3 years ago, then some recurrence recently.  Additionally had some 

dysphagia during chemo as well - food going down slowly, but not necessarily 

getting stuck - no issues w/ this now though.  No hematemesis, hematochezia, 

melena, diarrhea, or constipation.  Has gained weight.


No previous EGD or colonoscopy but her PCP has recommended these be scheduled at

[facility] soon.


No liver, pancreas, or PUD history.


No NSAIDs.


Had COVID vaccines/booster.





PMH:


PMH:


HTN, breast cancer s/p bilateral mastectomy and chemo on tamoxifen, goiter s/p 

partial thyroidectomy, adrenal adenoma


ovarian cystectomy





FH:


Family History:  Other (grandmother had cholecystectomy)





Social History:


Smoke:  Quit


ALCOHOL:  none


Drugs:  None





ROS:





GEN: Denies fevers, chills, sweats


HEENT: Denies blurred vision, sore throat


CV: Denies chest pain


RESP: Denies shortness of air, cough


GI: Per HPI


: Denies hematuria, dysuria


ENDO: +weight gain


NEURO: Denies confusion, dizziness


MSK: Denies weakness, joint pain/swelling


SKIN: Denies jaundice, pruritus





Vitals:


Vitals:





                                   Vital Signs








  Date Time  Temp Pulse Resp B/P (MAP) Pulse Ox O2 Delivery O2 Flow Rate FiO2


 


2/7/22 08:26  63 18 139/86 (103) 99 Room Air  


 


2/7/22 05:24 98.0       





 98.0       











Labs:


Labs:





Laboratory Tests








Test


 2/7/22


05:15 2/7/22


05:18 2/7/22


06:30 2/7/22


07:26


 


Urine Collection Type Unknown    


 


Urine Color Yellow    


 


Urine Clarity Clear    


 


Urine pH 6.5 (<5.0-8.0)    


 


Urine Specific Gravity


 1.020


(1.000-1.030) 


 


 





 


Urine Protein


 Negative mg/dL


(NEG-TRACE) 


 


 





 


Urine Glucose (UA)


 Negative mg/dL


(NEG) 


 


 





 


Urine Ketones (Stick)


 Trace mg/dL


(NEG) 


 


 





 


Urine Blood Negative (NEG)    


 


Urine Nitrite Negative (NEG)    


 


Urine Bilirubin Negative (NEG)    


 


Urine Urobilinogen Dipstick


 0.2 mg/dL (0.2


mg/dL) 


 


 





 


Urine Leukocyte Esterase Small (NEG)    


 


Urine RBC Occ /HPF (0-2)    


 


Urine WBC


 6-10 /HPF


(0-4) 


 


 





 


Urine Squamous Epithelial


Cells Many /LPF 


 


 


 





 


Urine Bacteria


 Many /HPF


(0-FEW) 


 


 





 


Urine Mucus Marked /LPF    


 


Urine Yeast Present /HPF    


 


Bedside Urine HCG, Qualitative


 


 Hcg negative


(Negative) 


 





 


White Blood Count


 


 


 12.4 x10^3/uL


(4.0-11.0) 





 


Red Blood Count


 


 


 4.34 x10^6/uL


(3.50-5.40) 





 


Hemoglobin


 


 


 13.4 g/dL


(12.0-15.5) 





 


Hematocrit


 


 


 39.4 %


(36.0-47.0) 





 


Mean Corpuscular Volume   91 fL ()  


 


Mean Corpuscular Hemoglobin   31 pg (25-35)  


 


Mean Corpuscular Hemoglobin


Concent 


 


 34 g/dL


(31-37) 





 


Red Cell Distribution Width


 


 


 13.2 %


(11.5-14.5) 





 


Platelet Count


 


 


 249 x10^3/uL


(140-400) 





 


Neutrophils (%) (Auto)   83 % (31-73)  


 


Lymphocytes (%) (Auto)   14 % (24-48)  


 


Monocytes (%) (Auto)   3 % (0-9)  


 


Eosinophils (%) (Auto)   0 % (0-3)  


 


Basophils (%) (Auto)   1 % (0-3)  


 


Neutrophils # (Auto)


 


 


 10.3 x10^3/uL


(1.8-7.7) 





 


Lymphocytes # (Auto)


 


 


 1.7 x10^3/uL


(1.0-4.8) 





 


Monocytes # (Auto)


 


 


 0.3 x10^3/uL


(0.0-1.1) 





 


Eosinophils # (Auto)


 


 


 0.0 x10^3/uL


(0.0-0.7) 





 


Basophils # (Auto)


 


 


 0.1 x10^3/uL


(0.0-0.2) 





 


SARS-CoV-2 Antigen (Rapid)


 


 


 


 Negative


(NEGATIVE)











Allergies:


Coded Allergies:  


     No Known Drug Allergies (Unverified , 12/20/21)





Medications:





Current Medications








 Medications


  (Trade)  Dose


 Ordered  Sig/Dandre


 Route


 PRN Reason  Start Time


 Stop Time Status Last Admin


Dose Admin


 


 Sodium Chloride  1,000 ml @ 


 1,000 mls/hr  1X  ONCE


 IV


   2/7/22 06:15


 2/7/22 07:14 DC 2/7/22 06:29





 


 Ondansetron HCl


  (Zofran)  4 mg  1X  ONCE


 IVP


   2/7/22 06:15


 2/7/22 06:17 DC 2/7/22 06:29





 


 Morphine Sulfate


  (Morphine


 Sulfate)  4 mg  1X  ONCE


 IVP


   2/7/22 06:30


 2/7/22 06:31 DC 2/7/22 06:28





 


 Ketorolac


 Tromethamine


  (Toradol 15mg


 Vial)  15 mg  1X  ONCE


 IVP


   2/7/22 06:30


 2/7/22 06:31 DC 2/7/22 06:58





 


 Piperacillin Sod/


 Tazobactam Sod


 4.5 gm/Dextrose  100 ml @ 


 200 mls/hr  1X  ONCE


 IV


   2/7/22 07:32


 2/7/22 07:59 DC 2/7/22 08:25














Imaging:


Imaging:


RUQ US 2/7/22


Findings:


Liver:  The majority of the liver is visualized and there is increased 

echogenicity and attenuation of sound which further limits the sensitivity for 

possible subtle liver lesion. 


Common bile duct: Dilated to 9 mm in diameter. 


Gallbladder:  Distended with mild wall thickening up to 5 mm there is some small

stones or gravel.. 


Pancreas:  is not well visualized due to overlying bowel gas.


Right kidney: appears normal and measures 10 cm in length.


Aorta:  normal


IVC: normal


Impression: 


1. Cholelithiasis and acute cholecystitis.


2. Fatty infiltration of liver.


3. Dilated common bile duct.





PE:





GEN: NAD, supportive  Rizwan present


HEENT: Atraumatic, PERRL


LUNGS: CTAB


HEART: RRR


ABD: quiet, S/ND, epigastric and BUQ discomfort


EXTREMITY: No edema


SKIN: No rashes, no jaundice


NEURO/PSYCH: A & O 3





A/P:


A/P:


Upper abd pain, n/v - recurrent


Cholelithiasis, cholecystitis


Dilated CBD - 9mm


H/o GERD - controlled w/ PPI


CRC screen - none


Hepatic steatosis


H/o breast cancer


Rapid COVID negative





--


LFTs, lipase, and surgery opinion pending - await these, trend labs.


We discussed cholecystectomy/IOC w/ possible need for MRCP/ERCP.


Continue PPI.


Agree w/ plan for outpt scopes.











LAM CARMICHAEL          Feb 7, 2022 08:53

## 2022-02-07 NOTE — HP
DATE OF SERVICE: 02/07/2022

ADMIT DATE: 02/07/2022

CHIEF COMPLAINT:  Abdominal pain.



HISTORY OF PRESENT ILLNESS:  The patient is a pleasant 46-year-old female who 

presented to the ER with abdominal pain.  We did some imaging.  She seems to 

have cholecystitis.  I discussed the case with the ER physician.  We are going 

to admit the patient and consult General Surgery and GI.



PAST MEDICAL HISTORY:  Breast cancer, thyroid disease, ovarian cyst, double 

mastectomy, 75% thyroidectomy, ovarian cyst removal.



ALLERGIES:  None.



FAMILY HISTORY:  Diabetes.



SOCIAL HISTORY:  She does not drink, smoke or take drugs.  She is .  Her 

 is present, seems to be good support for her.



MEDICATIONS:  Reviewed.  Please refer to the MRAD.



REVIEW OF SYSTEMS:

GENERAL:  No history of weight change, weakness or fevers.

SKIN:  No bruising, hair changes or rashes.

EYES:  No blurred, double or loss of vision.

NOSE AND THROAT:  No history of nosebleeds, hoarseness or sore throat.

HEART:  No history of palpitations, chest pain or shortness of breath on 

exertion.

LUNGS:  Denies cough, hemoptysis, wheezing or shortness of breath.

GASTROINTESTINAL:  She complains of abdominal pain.

GENITOURINARY:  No history of frequency, urgency, hesitancy or nocturia.

NEUROLOGIC:  Denies history of numbness, tingling, tremor or weakness.

PSYCHIATRIC:  No history of panic, anxiety or depression.

ENDOCRINE:  No history of heat or cold intolerance, polyuria or polydipsia.

EXTREMITIES:  Denies muscle weakness, joint pain, pain on walking or stiffness.



PHYSICAL EXAMINATION:

VITALS:  Within normal limits and are stable.

GENERAL:  No apparent distress.  Alert and oriented.

HEENT:  Normal cephalic atraumatic, external auditory canals are patent.  Eyes: 

Extraocular muscles are intact, pupils are equally round and reactive to light 

and accommodation.

MUSCULOSKELETAL:  Well developed, well nourished, good range of motion.

ENDOCRINE:  No thyromegaly was palpated.

LYMPHATICS:  No cervical chain or axillary nodes were noted.

HEMATOPOIETIC:  No bruising.

NECK:  Supple, no JVD, no thyromegaly was noted.

LUNGS:  Clear to auscultation in all lung fields without rhonchi or wheezing.

HEART:  RRR, S1, S2 present.  Peripheral pulses intact, no obvious murmurs were 

noted.

ABDOMEN:  She has decreased bowel sounds with tenderness to palpation.

EXTREMITIES:  Without any cyanosis, clubbing, or edema.  Pedal pulses intact, 

Homans sign is negative.

NEUROLOGIC:  Normal speech, normal tone.  A and O x 3, moves all extremities, no

obvious focal deficits.

PSYCHIATRIC:  Normal affect, normal mood.  Stable.

SKIN:  No ulcerations or rashes, good skin turgor, no jaundice.

VASCULAR:  Good capillary refill, neurovascular bundle appears to be intact.



LABORATORY DATA:  White count is 12.  Electrolytes are normal.  Imaging is 

confirming gallstones and cholecystitis.



ASSESSMENT AND PLAN:  Gallstone cholecystitis.  The patient has been admitted.  

We will consult General Surgery and GI.  We started IV Zosyn, IV fluids, p.r.n. 

pain meds, p.r.n. Zofran, home meds.  Deep venous thrombosis prophylaxis.  Full 

code.







MYA/ARNALDO

DR: Peyton   DD: 02/07/2022 12:37

DT: 02/07/2022 12:57   TID: [id number]

## 2022-02-07 NOTE — RAD
Clinical History: Right upper quadrant pain and epigastric pain



Technique:  Sonographic examination of the right upper quadrant of the abdomen was performed and mult
iple static images were obtained.



Comparison:  none



Findings:



Liver:  The majority of the liver is visualized and there is increased echogenicity and attenuation o
f sound which further limits the sensitivity for possible subtle liver lesion. 

Common bile duct: Dilated to 9 mm in diameter. 

Gallbladder:  Distended with mild wall thickening up to 5 mm there is some small stones or gravel.. 

Pancreas:  is not well visualized due to overlying bowel gas.

Right kidney: appears normal and measures 10 cm in length.



Aorta:  normal

IVC: normal



Impression: 



1. Cholelithiasis and acute cholecystitis.

2. Fatty infiltration of liver.

3. Dilated common bile duct.



Electronically signed by: Toy Miller III, MD (2/7/2022 7:00 AM) [facility]

## 2022-02-08 VITALS — DIASTOLIC BLOOD PRESSURE: 70 MMHG | SYSTOLIC BLOOD PRESSURE: 109 MMHG

## 2022-02-08 VITALS — DIASTOLIC BLOOD PRESSURE: 76 MMHG | SYSTOLIC BLOOD PRESSURE: 117 MMHG

## 2022-02-08 VITALS — DIASTOLIC BLOOD PRESSURE: 79 MMHG | SYSTOLIC BLOOD PRESSURE: 116 MMHG

## 2022-02-08 VITALS — SYSTOLIC BLOOD PRESSURE: 97 MMHG | DIASTOLIC BLOOD PRESSURE: 68 MMHG

## 2022-02-08 VITALS — DIASTOLIC BLOOD PRESSURE: 78 MMHG | SYSTOLIC BLOOD PRESSURE: 110 MMHG

## 2022-02-08 VITALS — SYSTOLIC BLOOD PRESSURE: 124 MMHG | DIASTOLIC BLOOD PRESSURE: 83 MMHG

## 2022-02-08 VITALS — DIASTOLIC BLOOD PRESSURE: 83 MMHG | SYSTOLIC BLOOD PRESSURE: 124 MMHG

## 2022-02-08 VITALS — DIASTOLIC BLOOD PRESSURE: 71 MMHG | SYSTOLIC BLOOD PRESSURE: 114 MMHG

## 2022-02-08 VITALS — DIASTOLIC BLOOD PRESSURE: 68 MMHG | SYSTOLIC BLOOD PRESSURE: 110 MMHG

## 2022-02-08 VITALS — SYSTOLIC BLOOD PRESSURE: 117 MMHG | DIASTOLIC BLOOD PRESSURE: 76 MMHG

## 2022-02-08 VITALS — SYSTOLIC BLOOD PRESSURE: 116 MMHG | DIASTOLIC BLOOD PRESSURE: 79 MMHG

## 2022-02-08 VITALS — SYSTOLIC BLOOD PRESSURE: 127 MMHG | DIASTOLIC BLOOD PRESSURE: 88 MMHG

## 2022-02-08 VITALS — DIASTOLIC BLOOD PRESSURE: 67 MMHG | SYSTOLIC BLOOD PRESSURE: 104 MMHG

## 2022-02-08 LAB
ALBUMIN SERPL-MCNC: 2.9 G/DL (ref 3.4–5)
ALP SERPL-CCNC: 47 U/L (ref 46–116)
ALT SERPL-CCNC: 23 U/L (ref 14–59)
ANION GAP SERPL CALC-SCNC: 12 MMOL/L (ref 6–14)
AST SERPL-CCNC: 6 U/L (ref 15–37)
BASOPHILS # BLD AUTO: 0.1 X10^3/UL (ref 0–0.2)
BASOPHILS NFR BLD: 1 % (ref 0–3)
BILIRUB DIRECT SERPL-MCNC: 0.1 MG/DL (ref 0–0.2)
BILIRUB SERPL-MCNC: 0.3 MG/DL (ref 0.2–1)
BUN SERPL-MCNC: 12 MG/DL (ref 7–20)
CALCIUM SERPL-MCNC: 8.6 MG/DL (ref 8.5–10.1)
CHLORIDE SERPL-SCNC: 107 MMOL/L (ref 98–107)
CO2 SERPL-SCNC: 27 MMOL/L (ref 21–32)
CREAT SERPL-MCNC: 0.8 MG/DL (ref 0.6–1)
EOSINOPHIL NFR BLD: 0.3 X10^3/UL (ref 0–0.7)
EOSINOPHIL NFR BLD: 4 % (ref 0–3)
ERYTHROCYTE [DISTWIDTH] IN BLOOD BY AUTOMATED COUNT: 13.5 % (ref 11.5–14.5)
GFR SERPLBLD BASED ON 1.73 SQ M-ARVRAT: 77.6 ML/MIN
GLUCOSE SERPL-MCNC: 94 MG/DL (ref 70–99)
HCT VFR BLD CALC: 36.8 % (ref 36–47)
HGB BLD-MCNC: 12.4 G/DL (ref 12–15.5)
LYMPHOCYTES # BLD: 2.7 X10^3/UL (ref 1–4.8)
LYMPHOCYTES NFR BLD AUTO: 36 % (ref 24–48)
MCH RBC QN AUTO: 31 PG (ref 25–35)
MCHC RBC AUTO-ENTMCNC: 34 G/DL (ref 31–37)
MCV RBC AUTO: 91 FL (ref 79–100)
MONO #: 0.6 X10^3/UL (ref 0–1.1)
MONOCYTES NFR BLD: 8 % (ref 0–9)
NEUT #: 3.8 X10^3/UL (ref 1.8–7.7)
NEUTROPHILS NFR BLD AUTO: 52 % (ref 31–73)
PLATELET # BLD AUTO: 193 X10^3/UL (ref 140–400)
POTASSIUM SERPL-SCNC: 3.7 MMOL/L (ref 3.5–5.1)
PROT SERPL-MCNC: 6 G/DL (ref 6.4–8.2)
RBC # BLD AUTO: 4.04 X10^6/UL (ref 3.5–5.4)
SODIUM SERPL-SCNC: 146 MMOL/L (ref 136–145)
WBC # BLD AUTO: 7.4 X10^3/UL (ref 4–11)

## 2022-02-08 RX ADMIN — PIPERACILLIN SODIUM AND TAZOBACTAM SODIUM SCH MLS/HR: 3; .375 INJECTION, POWDER, LYOPHILIZED, FOR SOLUTION INTRAVENOUS at 05:35

## 2022-02-08 RX ADMIN — PANTOPRAZOLE SODIUM SCH MG: 40 INJECTION, POWDER, FOR SOLUTION INTRAVENOUS at 08:54

## 2022-02-08 RX ADMIN — FENTANYL CITRATE PRN MCG: 50 INJECTION INTRAMUSCULAR; INTRAVENOUS at 15:00

## 2022-02-08 RX ADMIN — DOCUSATE SODIUM SCH MG: 100 CAPSULE, LIQUID FILLED ORAL at 20:31

## 2022-02-08 RX ADMIN — SODIUM CHLORIDE, SODIUM LACTATE, POTASSIUM CHLORIDE, AND CALCIUM CHLORIDE SCH MLS/HR: .6; .31; .03; .02 INJECTION, SOLUTION INTRAVENOUS at 14:45

## 2022-02-08 RX ADMIN — FENTANYL CITRATE PRN MCG: 50 INJECTION INTRAMUSCULAR; INTRAVENOUS at 15:16

## 2022-02-08 RX ADMIN — PIPERACILLIN SODIUM AND TAZOBACTAM SODIUM SCH MLS/HR: 3; .375 INJECTION, POWDER, LYOPHILIZED, FOR SOLUTION INTRAVENOUS at 12:14

## 2022-02-08 RX ADMIN — HYDROMORPHONE HYDROCHLORIDE PRN MG: 2 INJECTION INTRAMUSCULAR; INTRAVENOUS; SUBCUTANEOUS at 16:02

## 2022-02-08 RX ADMIN — PIPERACILLIN SODIUM AND TAZOBACTAM SODIUM SCH MLS/HR: 3; .375 INJECTION, POWDER, LYOPHILIZED, FOR SOLUTION INTRAVENOUS at 18:32

## 2022-02-08 RX ADMIN — PIPERACILLIN SODIUM AND TAZOBACTAM SODIUM SCH MLS/HR: 3; .375 INJECTION, POWDER, LYOPHILIZED, FOR SOLUTION INTRAVENOUS at 23:38

## 2022-02-08 RX ADMIN — HYDROMORPHONE HYDROCHLORIDE PRN MG: 2 INJECTION INTRAMUSCULAR; INTRAVENOUS; SUBCUTANEOUS at 16:21

## 2022-02-08 NOTE — PDOC
SURGICAL PROGRESS NOTE


DATE: 2/8/22 


TIME: 13:16


Subjective


Pre-Op Note


47 yo F with calculous cholecystitis.


TO OR for laparoscopic versus open cholecystectomy with cholangiogram.


R/R/B/A d/w pt.  Risks, including, but not limited to:  bleeding, infection, 

damage to surrounding structures, risk of anesthesia, risk of open.


She appears to understand, her questions are answered and she elects to proceed.


Vital Signs





Vital Signs








  Date Time  Temp Pulse Resp B/P (MAP) Pulse Ox O2 Delivery O2 Flow Rate FiO2


 


2/8/22 12:12 97.8 93 15 116/80 96 Room Air  





 97.8       








I&O











Intake and Output 


 


 2/8/22





 07:00


 


Intake Total 1990 ml


 


Balance 1990 ml


 


 


 


Intake Oral 240 ml


 


IV Total 1750 ml


 


# Voids 2








Labs





Laboratory Tests








Test


 2/7/22


05:15 2/7/22


05:18 2/7/22


06:30 2/7/22


07:26


 


Urine Collection Type Unknown    


 


Urine Color Yellow    


 


Urine Clarity Clear    


 


Urine pH 6.5 (<5.0-8.0)    


 


Urine Specific Gravity


 1.020


(1.000-1.030) 


 


 





 


Urine Protein


 Negative mg/dL


(NEG-TRACE) 


 


 





 


Urine Glucose (UA)


 Negative mg/dL


(NEG) 


 


 





 


Urine Ketones (Stick)


 Trace mg/dL


(NEG) 


 


 





 


Urine Blood Negative (NEG)    


 


Urine Nitrite Negative (NEG)    


 


Urine Bilirubin Negative (NEG)    


 


Urine Urobilinogen Dipstick


 0.2 mg/dL (0.2


mg/dL) 


 


 





 


Urine Leukocyte Esterase Small (NEG)    


 


Urine RBC Occ /HPF (0-2)    


 


Urine WBC


 6-10 /HPF


(0-4) 


 


 





 


Urine Squamous Epithelial


Cells Many /LPF 


 


 


 





 


Urine Bacteria


 Many /HPF


(0-FEW) 


 


 





 


Urine Mucus Marked /LPF    


 


Urine Yeast Present /HPF    


 


Bedside Urine HCG, Qualitative


 


 Hcg negative


(Negative) 


 





 


White Blood Count


 


 


 12.4 x10^3/uL


(4.0-11.0) 





 


Red Blood Count


 


 


 4.34 x10^6/uL


(3.50-5.40) 





 


Hemoglobin


 


 


 13.4 g/dL


(12.0-15.5) 





 


Hematocrit


 


 


 39.4 %


(36.0-47.0) 





 


Mean Corpuscular Volume   91 fL ()  


 


Mean Corpuscular Hemoglobin   31 pg (25-35)  


 


Mean Corpuscular Hemoglobin


Concent 


 


 34 g/dL


(31-37) 





 


Red Cell Distribution Width


 


 


 13.2 %


(11.5-14.5) 





 


Platelet Count


 


 


 249 x10^3/uL


(140-400) 





 


Neutrophils (%) (Auto)   83 % (31-73)  


 


Lymphocytes (%) (Auto)   14 % (24-48)  


 


Monocytes (%) (Auto)   3 % (0-9)  


 


Eosinophils (%) (Auto)   0 % (0-3)  


 


Basophils (%) (Auto)   1 % (0-3)  


 


Neutrophils # (Auto)


 


 


 10.3 x10^3/uL


(1.8-7.7) 





 


Lymphocytes # (Auto)


 


 


 1.7 x10^3/uL


(1.0-4.8) 





 


Monocytes # (Auto)


 


 


 0.3 x10^3/uL


(0.0-1.1) 





 


Eosinophils # (Auto)


 


 


 0.0 x10^3/uL


(0.0-0.7) 





 


Basophils # (Auto)


 


 


 0.1 x10^3/uL


(0.0-0.2) 





 


SARS-CoV-2 RNA (MARQUEZ)


 


 


 


 Negative


(Negative)


 


SARS-CoV-2 Antigen (Rapid)


 


 


 


 Negative


(NEGATIVE)


 


Test


 2/7/22


08:20 2/8/22


04:45 


 





 


Sodium Level


 139 mmol/L


(136-145) 146 mmol/L


(136-145) 


 





 


Potassium Level


 3.6 mmol/L


(3.5-5.1) 3.7 mmol/L


(3.5-5.1) 


 





 


Chloride Level


 100 mmol/L


() 107 mmol/L


() 


 





 


Carbon Dioxide Level


 26 mmol/L


(21-32) 27 mmol/L


(21-32) 


 





 


Anion Gap 13 (6-14)  12 (6-14)   


 


Blood Urea Nitrogen


 14 mg/dL


(7-20) 12 mg/dL


(7-20) 


 





 


Creatinine


 0.8 mg/dL


(0.6-1.0) 0.8 mg/dL


(0.6-1.0) 


 





 


Estimated GFR


(Cockcroft-Gault) 77.6 


 77.6 


 


 





 


BUN/Creatinine Ratio 18 (6-20)    


 


Glucose Level


 123 mg/dL


(70-99) 94 mg/dL


(70-99) 


 





 


Calcium Level


 9.4 mg/dL


(8.5-10.1) 8.6 mg/dL


(8.5-10.1) 


 





 


Total Bilirubin


 0.2 mg/dL


(0.2-1.0) 0.3 mg/dL


(0.2-1.0) 


 





 


Aspartate Amino Transf


(AST/SGOT) 18 U/L (15-37) 


 6 U/L (15-37) 


 


 





 


Alanine Aminotransferase


(ALT/SGPT) 40 U/L (14-59) 


 23 U/L (14-59) 


 


 





 


Alkaline Phosphatase


 68 U/L


() 47 U/L


() 


 





 


Total Protein


 7.8 g/dL


(6.4-8.2) 6.0 g/dL


(6.4-8.2) 


 





 


Albumin


 3.7 g/dL


(3.4-5.0) 2.9 g/dL


(3.4-5.0) 


 





 


Albumin/Globulin Ratio 0.9 (1.0-1.7)    


 


Lipase


 68 U/L


() 


 


 





 


White Blood Count


 


 7.4 x10^3/uL


(4.0-11.0) 


 





 


Red Blood Count


 


 4.04 x10^6/uL


(3.50-5.40) 


 





 


Hemoglobin


 


 12.4 g/dL


(12.0-15.5) 


 





 


Hematocrit


 


 36.8 %


(36.0-47.0) 


 





 


Mean Corpuscular Volume  91 fL ()   


 


Mean Corpuscular Hemoglobin  31 pg (25-35)   


 


Mean Corpuscular Hemoglobin


Concent 


 34 g/dL


(31-37) 


 





 


Red Cell Distribution Width


 


 13.5 %


(11.5-14.5) 


 





 


Platelet Count


 


 193 x10^3/uL


(140-400) 


 





 


Neutrophils (%) (Auto)  52 % (31-73)   


 


Lymphocytes (%) (Auto)  36 % (24-48)   


 


Monocytes (%) (Auto)  8 % (0-9)   


 


Eosinophils (%) (Auto)  4 % (0-3)   


 


Basophils (%) (Auto)  1 % (0-3)   


 


Neutrophils # (Auto)


 


 3.8 x10^3/uL


(1.8-7.7) 


 





 


Lymphocytes # (Auto)


 


 2.7 x10^3/uL


(1.0-4.8) 


 





 


Monocytes # (Auto)


 


 0.6 x10^3/uL


(0.0-1.1) 


 





 


Eosinophils # (Auto)


 


 0.3 x10^3/uL


(0.0-0.7) 


 





 


Basophils # (Auto)


 


 0.1 x10^3/uL


(0.0-0.2) 


 





 


Direct Bilirubin


 


 0.1 mg/dL


(0.0-0.2) 


 











Laboratory Tests








Test


 2/8/22


04:45


 


White Blood Count


 7.4 x10^3/uL


(4.0-11.0)


 


Red Blood Count


 4.04 x10^6/uL


(3.50-5.40)


 


Hemoglobin


 12.4 g/dL


(12.0-15.5)


 


Hematocrit


 36.8 %


(36.0-47.0)


 


Mean Corpuscular Volume 91 fL () 


 


Mean Corpuscular Hemoglobin 31 pg (25-35) 


 


Mean Corpuscular Hemoglobin


Concent 34 g/dL


(31-37)


 


Red Cell Distribution Width


 13.5 %


(11.5-14.5)


 


Platelet Count


 193 x10^3/uL


(140-400)


 


Neutrophils (%) (Auto) 52 % (31-73) 


 


Lymphocytes (%) (Auto) 36 % (24-48) 


 


Monocytes (%) (Auto) 8 % (0-9) 


 


Eosinophils (%) (Auto) 4 % (0-3) 


 


Basophils (%) (Auto) 1 % (0-3) 


 


Neutrophils # (Auto)


 3.8 x10^3/uL


(1.8-7.7)


 


Lymphocytes # (Auto)


 2.7 x10^3/uL


(1.0-4.8)


 


Monocytes # (Auto)


 0.6 x10^3/uL


(0.0-1.1)


 


Eosinophils # (Auto)


 0.3 x10^3/uL


(0.0-0.7)


 


Basophils # (Auto)


 0.1 x10^3/uL


(0.0-0.2)


 


Sodium Level


 146 mmol/L


(136-145)


 


Potassium Level


 3.7 mmol/L


(3.5-5.1)


 


Chloride Level


 107 mmol/L


()


 


Carbon Dioxide Level


 27 mmol/L


(21-32)


 


Anion Gap 12 (6-14) 


 


Blood Urea Nitrogen


 12 mg/dL


(7-20)


 


Creatinine


 0.8 mg/dL


(0.6-1.0)


 


Estimated GFR


(Cockcroft-Gault) 77.6 





 


Glucose Level


 94 mg/dL


(70-99)


 


Calcium Level


 8.6 mg/dL


(8.5-10.1)


 


Total Bilirubin


 0.3 mg/dL


(0.2-1.0)


 


Direct Bilirubin


 0.1 mg/dL


(0.0-0.2)


 


Aspartate Amino Transf


(AST/SGOT) 6 U/L (15-37) 





 


Alanine Aminotransferase


(ALT/SGPT) 23 U/L (14-59) 





 


Alkaline Phosphatase


 47 U/L


()


 


Total Protein


 6.0 g/dL


(6.4-8.2)


 


Albumin


 2.9 g/dL


(3.4-5.0)








Problem List


Problems


Medical Problems:


(1) Acute cholecystitis


Status: Acute  





(2) Right upper quadrant abdominal pain


Status: Acute  











Justicifation of Admission Dx:


Justifications for Admission:


Justification of Admission Dx:  Yes











ROWENA FARNSWORTH MD              Feb 8, 2022 13:18

## 2022-02-08 NOTE — NUR
SW following. Discussed with RN, pt from home with , room air, NPO, independent, 
COVID-19 negative. Surgery following. RN advised no SW needs at this time. SW will continue 
to follow.

## 2022-02-08 NOTE — PDOC4
OPERATIVE NOTE


Date:


Date:  Feb 8, 2022





Pre-Op Diagnosis:


Calculous cholecystitis





Post-Op Diagnosis:


same





Procedure Performed:


laparoscopic cholecystectomy with cholangiogram





Surgeon:


Silvestre Farnsworth





Anesthesia Type:


GETA plus local





Blood Loss:


50





Specimans Obtained:


gallbladder





Findings:


edematous gallbladder with adhesions, some spasm of distal CBD, but no filling 

defect and normal cholangiogram, no other pathology noted





Complications:


none





Operative Note:


After obtaining informed consent, patient was taken to OR, induced under GETA 

and prepped in the usual fashion.  5 mm ports placed umbilical and RUQ, 12 port 

placed epigastric, all under laparoscopic guidance.  Abdominal cavity was 

explored and noted as above.  Gallbladder was grasped and triangl of calot 

exposed.  Critical view achieved, demonstrating cystic artery and duct only 

structure into gallbladder.  Cystic artery ligated with clips.  Cholangiogram 

was obtained via cystic duct and demonstrated spasm of sphincter, but cleared 

and normal otherwise.  Cystic duct controlled with clips and hemolok.  

Gallbladder taken off fossa using cautery, placed in bag, delivered and sent to 

pathology.  Copious irrigation.  No evidence of bleeding or other pathology.  

Ports removed without bleeding.  Fascia repaired with 0 vicryl.  Skin repaired 

with 4 0 monocryl.  Dressing placed.





Patient tolerated procedure well and sent to PACU in stable condition.  All 

counts correct.  Wound class is 3.











ROWENA FARNSWORTH MD              Feb 8, 2022 14:41

## 2022-02-08 NOTE — PDOC
Date of Service:


DATE: 2/8/22 


TIME: 10:04





Subjective:


Subjective:


Feels better, awaiting surgery.





Objective:


Vital Signs:





                                   Vital Signs








  Date Time  Temp Pulse Resp B/P (MAP) Pulse Ox O2 Delivery O2 Flow Rate FiO2


 


2/8/22 07:00 97.8 94 17 104/67 (79) 95 Room Air  





 97.8       








Labs:





Laboratory Tests








Test


 2/8/22


04:45


 


White Blood Count 7.4 x10^3/uL 


 


Red Blood Count 4.04 x10^6/uL 


 


Hemoglobin 12.4 g/dL 


 


Hematocrit 36.8 % 


 


Mean Corpuscular Volume 91 fL 


 


Mean Corpuscular Hemoglobin 31 pg 


 


Mean Corpuscular Hemoglobin


Concent 34 g/dL 





 


Red Cell Distribution Width 13.5 % 


 


Platelet Count 193 x10^3/uL 


 


Neutrophils (%) (Auto) 52 % 


 


Lymphocytes (%) (Auto) 36 % 


 


Monocytes (%) (Auto) 8 % 


 


Eosinophils (%) (Auto) 4 % 


 


Basophils (%) (Auto) 1 % 


 


Neutrophils # (Auto) 3.8 x10^3/uL 


 


Lymphocytes # (Auto) 2.7 x10^3/uL 


 


Monocytes # (Auto) 0.6 x10^3/uL 


 


Eosinophils # (Auto) 0.3 x10^3/uL 


 


Basophils # (Auto) 0.1 x10^3/uL 


 


Sodium Level 146 mmol/L 


 


Potassium Level 3.7 mmol/L 


 


Chloride Level 107 mmol/L 


 


Carbon Dioxide Level 27 mmol/L 


 


Anion Gap 12 


 


Blood Urea Nitrogen 12 mg/dL 


 


Creatinine 0.8 mg/dL 


 


Estimated GFR


(Cockcroft-Gault) 77.6 





 


Glucose Level 94 mg/dL 


 


Calcium Level 8.6 mg/dL 


 


Total Bilirubin 0.3 mg/dL 


 


Direct Bilirubin 0.1 mg/dL 


 


Aspartate Amino Transf


(AST/SGOT) 6 U/L 





 


Alanine Aminotransferase


(ALT/SGPT) 23 U/L 





 


Alkaline Phosphatase 47 U/L 


 


Total Protein 6.0 g/dL 


 


Albumin 2.9 g/dL 











PE:





GEN: NAD


LUNGS: CTAB


HEART: RRR


ABD: S/ND/NT


NEURO/PSYCH: A & O 3





A/P:


Recurrent upper abd pain - better


Cholelithiasis/cholecystitis


Dilated CBD - normal LFTs x 2


H/o GERD - controlled w/ PPI


COVID negative





--


Plans for cholecystectomy/IOC today - following along.


Continue PPI - change to PO as able.  Outpt EGD and colonoscopy as previously 

discussed.





Justicifation of Admission Dx:


Justifications for Admission:


Justification of Admission Dx:  Yes











LAM CARMICHAEL          Feb 8, 2022 10:07

## 2022-02-08 NOTE — NUR
Nurse's note:

The patient underwent lap cholecystectomy and came back on the unit at 1633. VSS /80 
HR75 RR19 O2 sat at 93%;Pain at 4-5/10. Lap site dressing are clean, dry, and intact. She 
tolerated clear liquids.

## 2022-02-08 NOTE — PDOC
[facility] [facility] PROGRESS NOTE


Date of Service


DOS:


DATE: 2/8/22 


TIME: 10:03





Chief Complaint


Chief Complaint


Gallstone cholecystitis


PMH: 


Breast cancer 


Double mastectomy,


Thyroid disease


75% thyroidectomy


Ovarian cyst


Ovarian cyst removal





History of Present Illness


History of Present Illness


Patient seen and examined beside. Was alert, pleasant, and ready for surgery 

this afternoon. 


Chart reviewed


Discussed with RN





Vitals/I&O


Vitals/I&O:





                                   Vital Signs








  Date Time  Temp Pulse Resp B/P (MAP) Pulse Ox O2 Delivery O2 Flow Rate FiO2


 


2/8/22 07:00 97.8 94 17 104/67 (79) 95 Room Air  





 97.8       














                                    I & O   


 


 2/7/22 2/7/22 2/8/22





 15:00 23:00 07:00


 


Intake Total 600 ml 1050 ml 340 ml


 


Balance 600 ml 1050 ml 340 ml











Physical Exam


General:  Alert, Oriented X3, Cooperative


Heart:  Regular rate, Normal S1, Normal S2


Abdomen:  Soft, Other (mild epigastric ttp )


Extremities:  No clubbing, No cyanosis


Skin:  No rashes, No breakdown





Labs


Labs:





Laboratory Tests








Test


 2/8/22


04:45


 


White Blood Count


 7.4 x10^3/uL


(4.0-11.0)


 


Red Blood Count


 4.04 x10^6/uL


(3.50-5.40)


 


Hemoglobin


 12.4 g/dL


(12.0-15.5)


 


Hematocrit


 36.8 %


(36.0-47.0)


 


Mean Corpuscular Volume 91 fL () 


 


Mean Corpuscular Hemoglobin 31 pg (25-35) 


 


Mean Corpuscular Hemoglobin


Concent 34 g/dL


(31-37)


 


Red Cell Distribution Width


 13.5 %


(11.5-14.5)


 


Platelet Count


 193 x10^3/uL


(140-400)


 


Neutrophils (%) (Auto) 52 % (31-73) 


 


Lymphocytes (%) (Auto) 36 % (24-48) 


 


Monocytes (%) (Auto) 8 % (0-9) 


 


Eosinophils (%) (Auto) 4 % (0-3) 


 


Basophils (%) (Auto) 1 % (0-3) 


 


Neutrophils # (Auto)


 3.8 x10^3/uL


(1.8-7.7)


 


Lymphocytes # (Auto)


 2.7 x10^3/uL


(1.0-4.8)


 


Monocytes # (Auto)


 0.6 x10^3/uL


(0.0-1.1)


 


Eosinophils # (Auto)


 0.3 x10^3/uL


(0.0-0.7)


 


Basophils # (Auto)


 0.1 x10^3/uL


(0.0-0.2)


 


Sodium Level


 146 mmol/L


(136-145)


 


Potassium Level


 3.7 mmol/L


(3.5-5.1)


 


Chloride Level


 107 mmol/L


()


 


Carbon Dioxide Level


 27 mmol/L


(21-32)


 


Anion Gap 12 (6-14) 


 


Blood Urea Nitrogen


 12 mg/dL


(7-20)


 


Creatinine


 0.8 mg/dL


(0.6-1.0)


 


Estimated GFR


(Cockcroft-Gault) 77.6 





 


Glucose Level


 94 mg/dL


(70-99)


 


Calcium Level


 8.6 mg/dL


(8.5-10.1)


 


Total Bilirubin


 0.3 mg/dL


(0.2-1.0)


 


Direct Bilirubin


 0.1 mg/dL


(0.0-0.2)


 


Aspartate Amino Transf


(AST/SGOT) 6 U/L (15-37) 





 


Alanine Aminotransferase


(ALT/SGPT) 23 U/L (14-59) 





 


Alkaline Phosphatase


 47 U/L


()


 


Total Protein


 6.0 g/dL


(6.4-8.2)


 


Albumin


 2.9 g/dL


(3.4-5.0)











Assessment and Plan


Assessmemt and Plan


ASSESSMENT:  


Gallstone cholecystitis


Breast cancer 


Double mastectomy,


Thyroid disease


75% thyroidectomy


Ovarian cyst


Ovarian cyst removal 





PLAN:


GI and Gen surg consulted - surgery 2/8. 


IV Zosyn 


IV fluids 


PRN pain meds 


PRN Zofran 


Home meds  


Deep venous thrombosis prophylaxis  


Full code








Comment


Review of Relevant


I have reviewed the following items christina (where applicable) has been applied.


Medications:





Current Medications








 Medications


  (Trade)  Dose


 Ordered  Sig/Dandre


 Route


 PRN Reason  Start Time


 Stop Time Status Last Admin


Dose Admin


 


 Piperacillin Sod/


 Tazobactam Sod


 3.375 gm/Sodium


 Chloride  50 ml @ 


 100 mls/hr  Q6HRS


 IV


   2/7/22 13:30


    2/8/22 05:35














Justifications for Admission


Other Justification














ANGEL DUNN III DO            Feb 8, 2022 10:07

## 2022-02-08 NOTE — RAD
EXAM: Intraoperative cholangiogram.



HISTORY: Cholecystectomy. Pain.



COMPARISON: None.



FINDINGS: 5 fluoroscopic images were obtained during an intraoperative cholangiogram. The total fluor
oscopy time is 0.23 minutes. There is contrast opacification of a dilated downstream common bile duct
 and the proximal small bowel. There is no evidence of a retained stone.



IMPRESSION: Intraoperative cholangiogram without evidence of a retained stone.



Electronically signed by: Cecille Laguna MD (2/8/2022 2:29 PM) [id number]

## 2022-02-09 VITALS
DIASTOLIC BLOOD PRESSURE: 75 MMHG | SYSTOLIC BLOOD PRESSURE: 116 MMHG | DIASTOLIC BLOOD PRESSURE: 75 MMHG | SYSTOLIC BLOOD PRESSURE: 116 MMHG

## 2022-02-09 VITALS — SYSTOLIC BLOOD PRESSURE: 122 MMHG | DIASTOLIC BLOOD PRESSURE: 75 MMHG

## 2022-02-09 VITALS — DIASTOLIC BLOOD PRESSURE: 69 MMHG | SYSTOLIC BLOOD PRESSURE: 115 MMHG

## 2022-02-09 RX ADMIN — DOCUSATE SODIUM SCH MG: 100 CAPSULE, LIQUID FILLED ORAL at 07:47

## 2022-02-09 RX ADMIN — PIPERACILLIN SODIUM AND TAZOBACTAM SODIUM SCH MLS/HR: 3; .375 INJECTION, POWDER, LYOPHILIZED, FOR SOLUTION INTRAVENOUS at 11:14

## 2022-02-09 RX ADMIN — SODIUM CHLORIDE, SODIUM LACTATE, POTASSIUM CHLORIDE, AND CALCIUM CHLORIDE SCH MLS/HR: .6; .31; .03; .02 INJECTION, SOLUTION INTRAVENOUS at 00:28

## 2022-02-09 RX ADMIN — PIPERACILLIN SODIUM AND TAZOBACTAM SODIUM SCH MLS/HR: 3; .375 INJECTION, POWDER, LYOPHILIZED, FOR SOLUTION INTRAVENOUS at 05:35

## 2022-02-09 RX ADMIN — PANTOPRAZOLE SODIUM SCH MG: 40 INJECTION, POWDER, FOR SOLUTION INTRAVENOUS at 07:47

## 2022-02-09 RX ADMIN — SODIUM CHLORIDE, SODIUM LACTATE, POTASSIUM CHLORIDE, AND CALCIUM CHLORIDE SCH MLS/HR: .6; .31; .03; .02 INJECTION, SOLUTION INTRAVENOUS at 10:45

## 2022-02-09 NOTE — PDOC
[employer] [facility] PROGRESS NOTE


Date of Service


DOS:


DATE: 2/9/22 


TIME: 08:20





Chief Complaint


Chief Complaint


Gallstone cholecystitis


PMH: 


Breast cancer 


Double mastectomy,


Thyroid disease


75% thyroidectomy


Ovarian cyst


Ovarian cyst removal





History of Present Illness


History of Present Illness


2/9/2022


Post-op day 1


Patient seen and examined at bedside


Patient alert and in pleasant mood, in no distress


Has urinated but no bowel movement yet


Ambulation encouraged


Probable discharge to home this afternoon


Discussed with RN


Chart reviewed





2/8/2022


Patient seen and examined beside. Was alert, pleasant, and ready for surgery 

this afternoon. 


Chart reviewed


Discussed with RN





Vitals/I&O


Vitals/I&O:





                                   Vital Signs








  Date Time  Temp Pulse Resp B/P (MAP) Pulse Ox O2 Delivery O2 Flow Rate FiO2


 


2/9/22 07:15      Room Air 6.0 


 


2/9/22 07:00 98.0 79 18 122/75 (91) 96   





 98.0       














                                    I & O   


 


 2/8/22 2/8/22 2/9/22





 15:00 23:00 07:00


 


Intake Total 900 ml 315 ml 100 ml


 


Output Total 90 ml  


 


Balance 810 ml 315 ml 100 ml











Physical Exam


General:  Alert, Oriented X3, Cooperative


Heart:  Regular rate, Normal S1, Normal S2


Abdomen:  Soft, Other (mild epigastric ttp )


Extremities:  No clubbing, No cyanosis


Skin:  No rashes, No breakdown





Assessment and Plan


Assessmemt and Plan


Problems


Medical Problems:


(1) Acute cholecystitis


Status: Acute  





(2) Right upper quadrant abdominal pain


Status: Acute  





ASSESSMENT:  


Gallstone cholecystitis


Breast cancer 


Double mastectomy,


Thyroid disease


75% thyroidectomy


Ovarian cyst


Ovarian cyst removal 





PLAN:


GI and Gen surg consulted - surgery 2/8. 


IV Zosyn 


IV fluids 


PRN pain meds 


PRN Zofran 


Probable discharge to home this afternoon


Home meds  


Deep venous thrombosis prophylaxis  


Full code








Comment


Review of Relevant


I have reviewed the following items christina (where applicable) has been applied.


Medications:





Current Medications








 Medications


  (Trade)  Dose


 Ordered  Sig/Dadnre


 Route


 PRN Reason  Start Time


 Stop Time Status Last Admin


Dose Admin


 


 Ringer's Solution  1,000 ml @ 


 75 mls/hr  [id number]


 IV


   2/8/22 12:30


 2/8/22 14:55 DC 2/8/22 12:24





 


 Iohexol


  (Omnipaque 300


 Mg/ml)  50 ml  STK-MED ONCE


 .ROUTE


   2/8/22 12:42


 2/8/22 12:42 DC 2/8/22 13:43





 


 Bupivacaine HCl/


 Epinephrine Bitart


  (Sensorcain-Epi


 0.25% Kit)  30 ml  STK-MED ONCE


 .ROUTE


   2/8/22 12:42


 2/8/22 12:43 DC 2/8/22 13:43





 


 Ketorolac


 Tromethamine


  (Toradol 15mg


 Vial)  15 mg  PRN Q6HRS  PRN


 IV


 MODERATE PAIN  2/8/22 14:45


 2/13/22 14:44  2/8/22 19:21





 


 Docusate Sodium


  (Colace)  100 mg  BID


 PO


   2/8/22 21:00


    2/9/22 07:47





 


 Fentanyl Citrate


  (Fentanyl 2ml


 Vial)  50 mcg  PRN Q5MIN  PRN


 IVP


 Acute Pain  2/8/22 15:15


 2/9/22 15:14  2/8/22 15:16





 


 Morphine Sulfate


  (Morphine


 Sulfate)  2 mg  PRN Q10MIN  PRN


 IVP


 Mild Pain  2/8/22 15:15


 2/9/22 15:14  2/8/22 15:31





 


 Hydromorphone HCl


  (Dilaudid)  0.4 mg  PRN Q10MIN  PRN


 IV


 MODERATE TO SEVERE PAIN  2/8/22 15:15


 2/9/22 15:14  2/8/22 16:21














Justifications for Admission


Other Justification














ANGEL DUNN III DO            Feb 9, 2022 08:23

## 2022-02-09 NOTE — DS
DATE OF DISCHARGE: 02/09/2022

ADMISSION DIAGNOSIS:  Cholecystitis.



DISCHARGE DIAGNOSES:  Postop laparoscopic cholecystectomy, history of breast 

cancer, double mastectomy, thyroid disease, 75% thyroidectomy, ovarian cyst and 

ovarian cyst removal.



HOSPITAL COURSE:  The patient is a pleasant, middle-aged female who presented 

with gallstone cholecystitis.  She was admitted.  We consulted General Surgery 

and placed her on IV antibiotics.  She was taken to the OR yesterday.  Today, I 

saw and examined her.  She looks great, wants to go home.  We plan to discharge.



DISPOSITION:  Home.



ACTIVITY:  As tolerated.



DIET:  Low sodium.



DISCHARGE MEDICATIONS:  Please see the MRAD.  Other than meds per routine, I 

sent scripts for p.r.n. Percocet 5 mg q.4 and Augmentin 500 b.i.d.  We will 

continue her home meds, which include amitriptyline 50 at bedtime, escitalopram 

10 a day, lisinopril 20 a day, hydrochlorothiazide 12.5 a day, lorazepam 0.5 q.8

p.r.n., Protonix 40 a day and tamoxifen 10 daily.



TOTAL TIME:  Thirty four minutes.







MYA/NAHEED/IVANA

DR: MYA/leigh   DD: 02/09/2022 08:26

DT: 02/09/2022 08:36   TID: [id number]

## 2022-02-09 NOTE — NUR
Discharge Note:



CORAL HOWARD



Discharge instructions and discharge home medications reviewed with Patient and a copy 
given. All questions have been answered and understanding verbalized. 



The following instructions and handouts were given: Discharge instructions, new 
prescriptions, education, and follow up recommendations.



Discontinued lines and drains: Peripheral IV discontinued intact.



Patient discharged to Home or Self Care with Spouse via Wheelchair off unit by CNA

## 2022-02-09 NOTE — NUR
SW following. Discussed with RN, pt from home with , room air, low fat diet. 
Discharge order for home with self care. RN advised no SW needs.

## 2022-02-09 NOTE — PDOC
Date of Service:


DATE: 2/9/22 


TIME: 09:34





Subjective:


Subjective:


Some post-op pain.


Anxious to go home.


Tolerating diet.  No flatus but feels rumbling.





Objective:


Vital Signs:





                                   Vital Signs








  Date Time  Temp Pulse Resp B/P (MAP) Pulse Ox O2 Delivery O2 Flow Rate FiO2


 


2/9/22 07:15      Room Air 6.0 


 


2/9/22 07:00 98.0 79 18 122/75 (91) 96   





 98.0       








Labs:


  CULTURE URINE  Final  


        20,000 CFU/ML


        FINAL ID=[id number] ALBICANS


        


        [place] [address]


        Medical director: Kristi Paez MD





     Organism 1                     CANDIDA ALBICANS


Imaging:


IOC


IMPRESSION: Intraoperative cholangiogram without evidence of a retained stone.





PE:





GEN: NAD


LUNGS: CTAB


HEART: RRR


ABD: NABS, S/ND, some tenderness RUQ/epigastrium


NEURO/PSYCH: A & O 3





A/P:


S/p cholecystectomy - IOC normal as above





--


DC per surgery.


UA noted - defer to primary.


Follow-up for scopes as discussed.





Justicifation of Admission Dx:


Justifications for Admission:


Justification of Admission Dx:  Yes











LAM CARMICHAEL          Feb 9, 2022 09:36

## 2022-02-11 NOTE — PATHOLOGY
[facility] Accession Number: [id number]

.                                                                01

Material submitted:                                        .

gallbladder - GALLBLADDER AND CONTENTS

.                                                                01

Clinical history:                                          .

ACUTE CHOLECYSTITIS

LAP VIDAL W/ GRAMS

.                                                                02

**********************************************************************

Diagnosis:

Gallbladder, laparoscopic cholecystectomy:

  - Cholelithiasis.

  - Acute and chronic cholecystitis with increased eosinophils.

(JPM:justice; 02/11/2022)

QMS  02/11/2022  1239 Local

**********************************************************************

.                                                                02

Comment:

There is no evidence of malignancy.

(JPM:justice; 02/11/2022)

.                                                                02

Electronically signed:                                     .

Yonas Vitale MD, Pathologist

NPI- [id number]

.                                                                01

Gross description:                                         .

Fixative: Formalin

Labeled: Gallbladder and contents

Specimen received: Intact

Dimensions: 10.0 x 3.3 x 3.0 cm

Lymph node: Not identified

Serosa: Gray-tan and dusky

Calculi: A green cholelith (1.5 x 1.2 x 1.2 cm)

Mucosa: Tan, green, velvety, with patchy areas of hemorrhage

Average wall thickness: 0.3 cm

Abnormalities: None

A1: Gallbladder, represented (Elim IRA; 2/10/2022)

DKA/DKA  02/10/2022  1231 Local

.                                                                02

Pathologist provided ICD-10:

K80.12

.                                                                02

CPT                                                        .

832802

Specimen Comment: A courtesy copy of this report has been sent to [phone]

Specimen Comment: [id number], [phone]

Specimen Comment: Report sent to , DR HOLDEN / DR DUNN

Specimen Comment: A duplicate report has been generated due to demographic updates.

***Performed at:  01

   [address], [place], [place]  [id number]

   MD Sina Lazaro MD Phone:  [phone]

***Performed at:  02

   [address]  [id number]

   MD Yonas Vitale MD Phone:  [phone]

## 2022-04-08 ENCOUNTER — HOSPITAL ENCOUNTER (OUTPATIENT)
Dept: HOSPITAL 61 - KCIC CT | Age: 47
End: 2022-04-08
Attending: FAMILY MEDICINE
Payer: COMMERCIAL

## 2022-04-08 DIAGNOSIS — Z82.49: ICD-10-CM

## 2022-04-08 DIAGNOSIS — D71: Primary | ICD-10-CM

## 2022-04-08 DIAGNOSIS — Z90.13: ICD-10-CM

## 2022-04-08 DIAGNOSIS — Q67.6: ICD-10-CM

## 2022-04-08 PROCEDURE — 75571 CT HRT W/O DYE W/CA TEST: CPT

## 2022-04-08 NOTE — [facility]
CT SCREENING FOR CORONARY ARTERY



History: Reason: Mixed hyperlipidemia, family hx. heart disease. / Spl. Instructions:  / History: 



Technique: With retrospective electrocardiogram gating axial reconstructed noncontrast images of the 
chest at the level of the coronary arteries was performed. Images were post processed on workstation 
and calcium score calculated using the modified Agatston Janowitz protocol.



Exposure: One or more of the following individualized dose reduction techniques were utilized for thi
s examination:  

1. Automated exposure control  

2. Adjustment of the mA and/or kV according to patient size  

3. Use of iterative reconstruction technique.



Comparison:  None



Findings: Total coronary calcium score is 0. No plaque burden and low cardiovascular disease risk. Th
is is based on the calcium score of 0 of the left main coronary artery, 0 of the left anterior descen
ding artery, score of 0 of the left circumflex artery and score of 0 of the right coronary artery.



Noncoronary findings:

Prior granulomatous disease within the chest. Bilateral mastectomies. Pectus excavatum deformity. Inf
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



IMPRESSION:

1.  Low cardiovascular disease risk. Calcium score 0.



Electronically signed by: Marco Carrera DO (4/8/2022 4:23 PM) [id number]